# Patient Record
Sex: FEMALE | Race: ASIAN | NOT HISPANIC OR LATINO | URBAN - METROPOLITAN AREA
[De-identification: names, ages, dates, MRNs, and addresses within clinical notes are randomized per-mention and may not be internally consistent; named-entity substitution may affect disease eponyms.]

---

## 2019-12-12 PROBLEM — Z00.00 ENCOUNTER FOR PREVENTIVE HEALTH EXAMINATION: Status: ACTIVE | Noted: 2019-12-12

## 2019-12-21 ENCOUNTER — OUTPATIENT (OUTPATIENT)
Dept: INPATIENT UNIT | Facility: HOSPITAL | Age: 24
LOS: 1 days | Discharge: HOME | End: 2019-12-21
Payer: COMMERCIAL

## 2019-12-21 ENCOUNTER — EMERGENCY (EMERGENCY)
Facility: HOSPITAL | Age: 24
LOS: 1 days | Discharge: STILL ADMITTED OR ADMINISTRATIVE | End: 2019-12-21
Admitting: EMERGENCY MEDICINE

## 2019-12-21 VITALS
DIASTOLIC BLOOD PRESSURE: 63 MMHG | HEART RATE: 74 BPM | SYSTOLIC BLOOD PRESSURE: 109 MMHG | TEMPERATURE: 97 F | RESPIRATION RATE: 20 BRPM | OXYGEN SATURATION: 100 %

## 2019-12-21 VITALS — HEART RATE: 66 BPM | DIASTOLIC BLOOD PRESSURE: 55 MMHG | TEMPERATURE: 98 F | SYSTOLIC BLOOD PRESSURE: 91 MMHG

## 2019-12-21 VITALS — TEMPERATURE: 98 F

## 2019-12-21 DIAGNOSIS — O26.899 OTHER SPECIFIED PREGNANCY RELATED CONDITIONS, UNSPECIFIED TRIMESTER: ICD-10-CM

## 2019-12-21 DIAGNOSIS — Z3A.00 WEEKS OF GESTATION OF PREGNANCY NOT SPECIFIED: ICD-10-CM

## 2019-12-21 LAB
ALBUMIN SERPL ELPH-MCNC: 3.8 G/DL — SIGNIFICANT CHANGE UP (ref 3.3–5)
ALP SERPL-CCNC: 49 U/L — SIGNIFICANT CHANGE UP (ref 40–120)
ALT FLD-CCNC: 15 U/L — SIGNIFICANT CHANGE UP (ref 4–33)
ANION GAP SERPL CALC-SCNC: 12 MMO/L — SIGNIFICANT CHANGE UP (ref 7–14)
APPEARANCE UR: CLEAR — SIGNIFICANT CHANGE UP
AST SERPL-CCNC: 17 U/L — SIGNIFICANT CHANGE UP (ref 4–32)
BASOPHILS # BLD AUTO: 0.04 K/UL — SIGNIFICANT CHANGE UP (ref 0–0.2)
BASOPHILS NFR BLD AUTO: 0.4 % — SIGNIFICANT CHANGE UP (ref 0–2)
BILIRUB SERPL-MCNC: < 0.2 MG/DL — LOW (ref 0.2–1.2)
BILIRUB UR-MCNC: NEGATIVE — SIGNIFICANT CHANGE UP
BLD GP AB SCN SERPL QL: NEGATIVE — SIGNIFICANT CHANGE UP
BLOOD UR QL VISUAL: NEGATIVE — SIGNIFICANT CHANGE UP
BUN SERPL-MCNC: 7 MG/DL — SIGNIFICANT CHANGE UP (ref 7–23)
CALCIUM SERPL-MCNC: 9.2 MG/DL — SIGNIFICANT CHANGE UP (ref 8.4–10.5)
CHLORIDE SERPL-SCNC: 102 MMOL/L — SIGNIFICANT CHANGE UP (ref 98–107)
CO2 SERPL-SCNC: 21 MMOL/L — LOW (ref 22–31)
COLOR SPEC: SIGNIFICANT CHANGE UP
CREAT SERPL-MCNC: 0.49 MG/DL — LOW (ref 0.5–1.3)
EOSINOPHIL # BLD AUTO: 0.1 K/UL — SIGNIFICANT CHANGE UP (ref 0–0.5)
EOSINOPHIL NFR BLD AUTO: 1 % — SIGNIFICANT CHANGE UP (ref 0–6)
GLUCOSE SERPL-MCNC: 92 MG/DL — SIGNIFICANT CHANGE UP (ref 70–99)
GLUCOSE UR-MCNC: NEGATIVE — SIGNIFICANT CHANGE UP
HCT VFR BLD CALC: 33.1 % — LOW (ref 34.5–45)
HGB BLD-MCNC: 10.6 G/DL — LOW (ref 11.5–15.5)
IMM GRANULOCYTES NFR BLD AUTO: 0.7 % — SIGNIFICANT CHANGE UP (ref 0–1.5)
KETONES UR-MCNC: NEGATIVE — SIGNIFICANT CHANGE UP
LEUKOCYTE ESTERASE UR-ACNC: NEGATIVE — SIGNIFICANT CHANGE UP
LYMPHOCYTES # BLD AUTO: 2.64 K/UL — SIGNIFICANT CHANGE UP (ref 1–3.3)
LYMPHOCYTES # BLD AUTO: 25.3 % — SIGNIFICANT CHANGE UP (ref 13–44)
MCHC RBC-ENTMCNC: 25.7 PG — LOW (ref 27–34)
MCHC RBC-ENTMCNC: 32 % — SIGNIFICANT CHANGE UP (ref 32–36)
MCV RBC AUTO: 80.1 FL — SIGNIFICANT CHANGE UP (ref 80–100)
MONOCYTES # BLD AUTO: 0.73 K/UL — SIGNIFICANT CHANGE UP (ref 0–0.9)
MONOCYTES NFR BLD AUTO: 7 % — SIGNIFICANT CHANGE UP (ref 2–14)
NEUTROPHILS # BLD AUTO: 6.86 K/UL — SIGNIFICANT CHANGE UP (ref 1.8–7.4)
NEUTROPHILS NFR BLD AUTO: 65.6 % — SIGNIFICANT CHANGE UP (ref 43–77)
NITRITE UR-MCNC: NEGATIVE — SIGNIFICANT CHANGE UP
NRBC # FLD: 0 K/UL — SIGNIFICANT CHANGE UP (ref 0–0)
PH UR: 8 — SIGNIFICANT CHANGE UP (ref 5–8)
PLATELET # BLD AUTO: 246 K/UL — SIGNIFICANT CHANGE UP (ref 150–400)
PMV BLD: 9.5 FL — SIGNIFICANT CHANGE UP (ref 7–13)
POTASSIUM SERPL-MCNC: 3.8 MMOL/L — SIGNIFICANT CHANGE UP (ref 3.5–5.3)
POTASSIUM SERPL-SCNC: 3.8 MMOL/L — SIGNIFICANT CHANGE UP (ref 3.5–5.3)
PROT SERPL-MCNC: 6.7 G/DL — SIGNIFICANT CHANGE UP (ref 6–8.3)
PROT UR-MCNC: NEGATIVE — SIGNIFICANT CHANGE UP
RBC # BLD: 4.13 M/UL — SIGNIFICANT CHANGE UP (ref 3.8–5.2)
RBC # FLD: 14.3 % — SIGNIFICANT CHANGE UP (ref 10.3–14.5)
RH IG SCN BLD-IMP: POSITIVE — SIGNIFICANT CHANGE UP
RH IG SCN BLD-IMP: POSITIVE — SIGNIFICANT CHANGE UP
SODIUM SERPL-SCNC: 135 MMOL/L — SIGNIFICANT CHANGE UP (ref 135–145)
SP GR SPEC: 1.01 — SIGNIFICANT CHANGE UP (ref 1–1.04)
UROBILINOGEN FLD QL: NORMAL — SIGNIFICANT CHANGE UP
WBC # BLD: 10.44 K/UL — SIGNIFICANT CHANGE UP (ref 3.8–10.5)
WBC # FLD AUTO: 10.44 K/UL — SIGNIFICANT CHANGE UP (ref 3.8–10.5)

## 2019-12-21 PROCEDURE — 76818 FETAL BIOPHYS PROFILE W/NST: CPT | Mod: 26

## 2019-12-21 PROCEDURE — 99203 OFFICE O/P NEW LOW 30 MIN: CPT

## 2019-12-21 PROCEDURE — 74181 MRI ABDOMEN W/O CONTRAST: CPT | Mod: 26

## 2019-12-21 RX ORDER — SODIUM CHLORIDE 9 MG/ML
1000 INJECTION, SOLUTION INTRAVENOUS ONCE
Refills: 0 | Status: COMPLETED | OUTPATIENT
Start: 2019-12-21 | End: 2019-12-21

## 2019-12-21 RX ORDER — SODIUM CHLORIDE 9 MG/ML
1000 INJECTION, SOLUTION INTRAVENOUS
Refills: 0 | Status: DISCONTINUED | OUTPATIENT
Start: 2019-12-21 | End: 2020-01-11

## 2019-12-21 RX ORDER — ACETAMINOPHEN 500 MG
1000 TABLET ORAL ONCE
Refills: 0 | Status: COMPLETED | OUTPATIENT
Start: 2019-12-21 | End: 2019-12-21

## 2019-12-21 RX ADMIN — SODIUM CHLORIDE 2000 MILLILITER(S): 9 INJECTION, SOLUTION INTRAVENOUS at 02:57

## 2019-12-21 RX ADMIN — Medication 400 MILLIGRAM(S): at 02:33

## 2019-12-21 RX ADMIN — Medication 1000 MILLIGRAM(S): at 02:56

## 2019-12-21 NOTE — OB PROVIDER TRIAGE NOTE - NSHPPHYSICALEXAM_GEN_ALL_CORE
BP:91/55, HR:66, Temp:36.5  Positive Crain's sign   TAS: Breech presentation, anterior placenta, MVP:3, EFEW:701.21gm, BPP:8/8  Speculum: Cervix appears closed  TVS: CL: 3.3-3.6 no funneling or dynamic changes

## 2019-12-21 NOTE — OB PROVIDER TRIAGE NOTE - ADDITIONAL INSTRUCTIONS
No  evidence of acute abdominal concerns at this time.  - discussed with   - patient to follow up with OBGYN next week.

## 2019-12-21 NOTE — OB PROVIDER TRIAGE NOTE - NSHPLABSRESULTS_GEN_ALL_CORE
Final MRI reading:   -MRI of abdomen and pelvis, no bowel obstruction  -normal appendix  -no inflammatory changes in the right lower quadrant  -2nd trimester gestation in breech presentation with anterior placenta  -right ovary is unremarkable  -the left ovary was not clearly visualized  -mild hydronephrosis consistent with gravid state  -cause of abdominal pain not identified

## 2019-12-21 NOTE — ED ADULT TRIAGE NOTE - CHIEF COMPLAINT QUOTE
pt is 24 weeks pregnant and is having right sided lower abdominal pain since an hour ago. . No vaginal bleeding or leaking. pt is sent to L&D. pt is 24 weeks pregnant and is having right sided lower abdominal pain since an hour ago. . No vaginal bleeding or leaking. pt is sent to L&D. LMP .

## 2019-12-21 NOTE — OB PROVIDER TRIAGE NOTE - NSOBPROVIDERNOTE_OBGYN_ALL_OB_FT
Dr. Monreal's pt. is a 25y/o EGA 24.5wks  pt. reports of severe lower right abdominal/right groin pain that started around 2200. Pt. states the pain comes and goes and is sharp. Pt. becomes more intense with fetal movement. Pt. denies N/V and lost of appetite. Pt. reports of normal bowel movement this morning. Pt. denies abdominal contractions, LOF, and VB. Pt. reports FM.    AP: Denies  Medical/surgical Hx: Denies  OBGYN Hx: Denies  Meds:PNV  NKDA      Assessment/Plan  BP:91/55, HR:66, Temp:36.5  NPO since 2100  Positive Crain's sign   TAS: Breech presentation, anterior placenta, MVP:3, EFEW:701.21gm, BPP:8/8  Speculum: Cervix appears closed  TVS: CL: 3.3-3.6 no funneling or dynamic changes Dr. Monreal's pt. is a 25y/o EGA 24.5wks  pt. reports of severe lower right abdominal/right groin pain that started around 2200. Pt. states the pain comes and goes and is sharp. Pt. becomes more intense with fetal movement. Pt. denies N/V and lost of appetite. Pt. reports of normal bowel movement this morning. Pt. denies abdominal contractions, LOF, and VB. Pt. reports FM.    AP: Denies  Medical/surgical Hx: Denies  OBGYN Hx: Denies  Meds:PNV  NKDA      Assessment/Plan  BP:91/55, HR:66, Temp:36.5  NPO since 2100  Positive Crain's sign   TAS: Breech presentation, anterior placenta, MVP:3, EFEW:701.21gm, BPP:8/8  Speculum: Cervix appears closed  TVS: CL: 3.3-3.6 no funneling or dynamic changes    2:10am Dr. Diaz at bedside  Plan for  -CBC, CMP, and T&S ordered  -MRI of abdomen ordered   -IV Tylenol ordered Pt. is a 23y/o EGA 24.5wks  pt. received prenatal care from [facility] in [place]. Pt. states the clinic dropped her care d/t insurance issues and now has an appointment with Dr. Monreal on 19. Pt. reports of severe lower right abdominal/right groin pain that started around 2200. Pt. states the pain comes and goes and is sharp. Pt. becomes more intense with fetal movement. Pt. denies N/V and lost of appetite. Pt. reports of normal bowel movement this morning. Pt. denies abdominal contractions, LOF, and VB. Pt. reports FM.    AP: Denies  Medical/surgical Hx: Denies  OBGYN Hx: Denies  Meds: PNV  NKDA      Assessment/Plan  BP:91/55, HR:66, Temp:36.5  NPO since 2100  Positive Crain's sign   TAS: Breech presentation, anterior placenta, MVP:3, EFEW:701.21gm, BPP:8/8  Speculum: Cervix appears closed  TVS: CL: 3.3-3.6 no funneling or dynamic changes    2:10am Dr. Diaz at bedside  Plan for  -CBC, CMP, and T&S ordered  -MRI of abdomen ordered   -IV Tylenol ordered Pt. is a 25y/o EGA 24.5wks  pt. received prenatal care from Dr. Mccoy with [facility] in [place]. Pt. states the clinic dropped her care d/t insurance issues and now has an appointment with Dr. Monreal on 19. Pt. reports of severe lower right abdominal/right groin pain that started around 2200. Pt. states the pain comes and goes and is sharp. Pt. becomes more intense with fetal movement. Pt. denies N/V and lost of appetite. Pt. reports of normal bowel movement this morning. Pt. denies abdominal contractions, LOF, and VB. Pt. reports FM.    AP: Denies  Medical Hx: Alpha thalassemia   Surgical Hx: Denies  OBGYN Hx: Denies  Meds: PNV  NKDA      Assessment/Plan  BP:91/55, HR:66, Temp:36.5  NPO since 2100  Positive Crain's sign   TAS: Breech presentation, anterior placenta, MVP:3, EFEW:701.21gm, BPP:8/8  Speculum: Cervix appears closed  TVS: CL: 3.3-3.6 no funneling or dynamic changes    2:10am Dr. Diaz at bedside  Plan for  -CBC, CMP, and T&S ordered  -MRI of abdomen ordered   -IV Tylenol ordered Pt. is a 25y/o EGA 24.5wks  pt. received prenatal care from Dr. Mccoy with [facility] in [place]. Pt. states the clinic dropped her care d/t insurance issues and now has an appointment with Dr. Monreal on 19. Pt. reports of severe lower right abdominal/right groin pain that started around 2200. Pt. states the pain comes and goes and is sharp. Pt. becomes more intense with fetal movement. Pt. denies N/V and lost of appetite. Pt. reports of normal bowel movement this morning. Pt. denies abdominal contractions, LOF, and VB. Pt. reports FM.    AP: Denies  Medical Hx: Alpha thalassemia   Surgical Hx: Denies  OBGYN Hx: Denies  Meds: PNV  NKDA      Assessment/Plan  BP:91/55, HR:66, Temp:36.5  NPO since 2100  Positive Crain's sign   TAS: Breech presentation, anterior placenta, MVP:3, EFEW:701.21gm, BPP:8/8  Speculum: Cervix appears closed  TVS: CL: 3.3-3.6 no funneling or dynamic changes    2:10am Dr. Diaz at bedside  Plan for  -CBC, CMP, and T&S ordered  -MRI of abdomen ordered   -IV Tylenol ordered    @4am pt. states relief from IV Tylenol. Pain 0/10  CBC Full  -  ( 21 Dec 2019 02:40 )  WBC Count : 10.44 K/uL  RBC Count : 4.13 M/uL  Hemoglobin : 10.6 g/dL  Hematocrit : 33.1 %  Platelet Count - Automated : 246 K/uL  Mean Cell Volume : 80.1 fL  Mean Cell Hemoglobin : 25.7 pg  Mean Cell Hemoglobin Concentration : 32.0 %  Auto Neutrophil # : 6.86 K/uL  Auto Lymphocyte # : 2.64 K/uL  Auto Monocyte # : 0.73 K/uL  Auto Eosinophil # : 0.10 K/uL  Auto Basophil # : 0.04 K/uL  Auto Neutrophil % : 65.6 %  Auto Lymphocyte % : 25.3 %  Auto Monocyte % : 7.0 %  Auto Eosinophil % : 1.0 %  Auto Basophil % : 0.4 %        135  |  102  |  7   ----------------------------<  92  3.8   |  21<L>  |  0.49<L>    Ca    9.2      21 Dec 2019 02:40    TPro  6.7  /  Alb  3.8  /  TBili  < 0.2<L>  /  DBili  x   /  AST  17  /  ALT  15  /  AlkPhos  49  12-21    Urinalysis Basic - ( 21 Dec 2019 03:55 )    Color: LIGHT YELLOW / Appearance: CLEAR / S.014 / pH: 8.0  Gluc: NEGATIVE / Ketone: NEGATIVE  / Bili: NEGATIVE / Urobili: NORMAL   Blood: NEGATIVE / Protein: NEGATIVE / Nitrite: NEGATIVE   Leuk Esterase: NEGATIVE / RBC: x / WBC x   Sq Epi: x / Non Sq Epi: x / Bacteria: x    @530am Reading room called for MRI result. Preliminary will be put in EMR shortly Pt. is a 25y/o EGA 24.5wks  pt. received prenatal care from Dr. Mccoy with [facility] in [place]. Pt. states the clinic dropped her care d/t insurance issues and now has an appointment with Dr. Monreal on 19. Pt. reports of severe lower right abdominal/right groin pain that started around 2200. Pt. states the pain comes and goes and is sharp. Pt. becomes more intense with fetal movement. Pt. denies N/V and loss of appetite. Pt. reports of normal bowel movement this morning. Pt. denies abdominal contractions, LOF, and VB. Pt. reports FM.    AP: Denies  Medical Hx: Alpha thalassemia   Surgical Hx: Denies  OBGYN Hx: Denies  Meds: PNV  NKDA      Assessment/Plan  BP:91/55, HR:66, Temp:36.5  NPO since 2100  Positive rebound tenderness  TAS: Breech presentation, anterior placenta, MVP:3, EFEW:701.21gm, BPP:8/8  Speculum: Cervix appears closed  TVS: CL: 3.3-3.6 no funneling or dynamic changes    2:10am Dr. Diaz at bedside  Plan for  -CBC, CMP, and T&S ordered  -MRI of abdomen ordered   -IV Tylenol ordered    @4am pt. states relief from IV Tylenol. Pain 0/10  CBC Full  -  ( 21 Dec 2019 02:40 )  WBC Count : 10.44 K/uL  RBC Count : 4.13 M/uL  Hemoglobin : 10.6 g/dL  Hematocrit : 33.1 %  Platelet Count - Automated : 246 K/uL  Mean Cell Volume : 80.1 fL  Mean Cell Hemoglobin : 25.7 pg  Mean Cell Hemoglobin Concentration : 32.0 %  Auto Neutrophil # : 6.86 K/uL  Auto Lymphocyte # : 2.64 K/uL  Auto Monocyte # : 0.73 K/uL  Auto Eosinophil # : 0.10 K/uL  Auto Basophil # : 0.04 K/uL  Auto Neutrophil % : 65.6 %  Auto Lymphocyte % : 25.3 %  Auto Monocyte % : 7.0 %  Auto Eosinophil % : 1.0 %  Auto Basophil % : 0.4 %        135  |  102  |  7   ----------------------------<  92  3.8   |  21<L>  |  0.49<L>    Ca    9.2      21 Dec 2019 02:40    TPro  6.7  /  Alb  3.8  /  TBili  < 0.2<L>  /  DBili  x   /  AST  17  /  ALT  15  /  AlkPhos  49  12-21    Urinalysis Basic - ( 21 Dec 2019 03:55 )    Color: LIGHT YELLOW / Appearance: CLEAR / S.014 / pH: 8.0  Gluc: NEGATIVE / Ketone: NEGATIVE  / Bili: NEGATIVE / Urobili: NORMAL   Blood: NEGATIVE / Protein: NEGATIVE / Nitrite: NEGATIVE   Leuk Esterase: NEGATIVE / RBC: x / WBC x   Sq Epi: x / Non Sq Epi: x / Bacteria: x    @530am Reading room called for MRI result. Preliminary will be put in EMR shortly Pt. is a 23y/o EGA 24.5wks  pt. received prenatal care from Dr. Mccoy with [facility] in [place]. Pt. states the clinic dropped her care d/t insurance issues and now has an appointment with Dr. Monreal on 19. Pt. reports of severe lower right abdominal/right groin pain that started around 2200. Pt. states the pain comes and goes and is sharp. Pt. becomes more intense with fetal movement. Pt. denies N/V and loss of appetite. Pt. reports of normal bowel movement this morning. Pt. denies abdominal contractions, LOF, and VB. Pt. reports FM.    AP: Denies  Medical Hx: Alpha thalassemia   Surgical Hx: Denies  OBGYN Hx: Denies  Meds: PNV  NKDA      Assessment/Plan  BP:91/55, HR:66, Temp:36.5  NPO since 2100  Positive rebound tenderness  TAS: Breech presentation, anterior placenta, MVP:3, EFEW:701.21gm, BPP:8/8  Speculum: Cervix appears closed  TVS: CL: 3.3-3.6 no funneling or dynamic changes    2:10am Dr. Diaz at bedside  Plan for  -CBC, CMP, and T&S ordered  -MRI of abdomen ordered   -IV Tylenol ordered    @4am pt. states relief from IV Tylenol. Pain 0/10  CBC Full  -  ( 21 Dec 2019 02:40 )  WBC Count : 10.44 K/uL  RBC Count : 4.13 M/uL  Hemoglobin : 10.6 g/dL  Hematocrit : 33.1 %  Platelet Count - Automated : 246 K/uL  Mean Cell Volume : 80.1 fL  Mean Cell Hemoglobin : 25.7 pg  Mean Cell Hemoglobin Concentration : 32.0 %  Auto Neutrophil # : 6.86 K/uL  Auto Lymphocyte # : 2.64 K/uL  Auto Monocyte # : 0.73 K/uL  Auto Eosinophil # : 0.10 K/uL  Auto Basophil # : 0.04 K/uL  Auto Neutrophil % : 65.6 %  Auto Lymphocyte % : 25.3 %  Auto Monocyte % : 7.0 %  Auto Eosinophil % : 1.0 %  Auto Basophil % : 0.4 %        135  |  102  |  7   ----------------------------<  92  3.8   |  21<L>  |  0.49<L>    Ca    9.2      21 Dec 2019 02:40    TPro  6.7  /  Alb  3.8  /  TBili  < 0.2<L>  /  DBili  x   /  AST  17  /  ALT  15  /  AlkPhos  49  12-21    Urinalysis Basic - ( 21 Dec 2019 03:55 )    Color: LIGHT YELLOW / Appearance: CLEAR / S.014 / pH: 8.0  Gluc: NEGATIVE / Ketone: NEGATIVE  / Bili: NEGATIVE / Urobili: NORMAL   Blood: NEGATIVE / Protein: NEGATIVE / Nitrite: NEGATIVE   Leuk Esterase: NEGATIVE / RBC: x / WBC x   Sq Epi: x / Non Sq Epi: x / Bacteria: x    @530am Reading room called for MRI result. Preliminary will be put in EMR shortly    Final MRI reading:   -MRI of abdomen and pelvis, no bowel obstruction  -normal appendix  -no inflammatory changes in the right lower quadrant  -2nd trimester gestation in breech presentation with anterior placenta  -right ovary is unremarkable  -the left ovary was not clearly visualized  -mild hydronephrosis consistent with gravid state  -cause of abdominal pain not identified    No  evidence of acute abdominal concerns at this time.  - discussed with   - patient to follow up with OBGYN next week.

## 2019-12-21 NOTE — ED ADULT NURSE NOTE - CHIEF COMPLAINT QUOTE
pt is 24 weeks pregnant and is having right sided lower abdominal pain since an hour ago. . No vaginal bleeding or leaking. pt is sent to L&D.

## 2019-12-21 NOTE — OB PROVIDER TRIAGE NOTE - HISTORY OF PRESENT ILLNESS
Dr. Monreal's pt. is a 25y/o EGA 24.5wks  pt. reports of severe lower right abdominal/right groin pain that started around 2200. Pt. states the pain comes and goes and is sharp. Pt. becomes more intense with fetal movement. Pt. denies N/V and lost of appetite. Pt. reports of normal bowel movement this morning. Pt. denies abdominal contractions, LOF, and VB. Pt. reports FM. Pt. is a 25y/o EGA 24.5wks  pt. received prenatal care from [facility] in [place]. Pt. states the clinic dropped her care d/t insurance issues and now has an appointment with Dr. Monreal on 19. Pt. reports of severe lower right abdominal/right groin pain that started around 2200. Pt. states the pain comes and goes and is sharp. Pt. becomes more intense with fetal movement. Pt. denies N/V and lost of appetite. Pt. reports of normal bowel movement this morning. Pt. denies abdominal contractions, LOF, and VB. Pt. reports FM.

## 2019-12-30 ENCOUNTER — NON-APPOINTMENT (OUTPATIENT)
Age: 24
End: 2019-12-30

## 2019-12-30 ENCOUNTER — APPOINTMENT (OUTPATIENT)
Dept: OBGYN | Facility: CLINIC | Age: 24
End: 2019-12-30
Payer: COMMERCIAL

## 2019-12-30 ENCOUNTER — TRANSCRIPTION ENCOUNTER (OUTPATIENT)
Age: 24
End: 2019-12-30

## 2019-12-30 VITALS
SYSTOLIC BLOOD PRESSURE: 100 MMHG | HEIGHT: 62 IN | BODY MASS INDEX: 20.61 KG/M2 | DIASTOLIC BLOOD PRESSURE: 59 MMHG | WEIGHT: 112 LBS

## 2019-12-30 DIAGNOSIS — Z83.3 FAMILY HISTORY OF DIABETES MELLITUS: ICD-10-CM

## 2019-12-30 DIAGNOSIS — Z82.49 FAMILY HISTORY OF ISCHEMIC HEART DISEASE AND OTHER DISEASES OF THE CIRCULATORY SYSTEM: ICD-10-CM

## 2019-12-30 PROBLEM — Z78.9 OTHER SPECIFIED HEALTH STATUS: Chronic | Status: ACTIVE | Noted: 2019-12-21

## 2019-12-30 PROCEDURE — 99203 OFFICE O/P NEW LOW 30 MIN: CPT

## 2019-12-31 LAB
APPEARANCE: ABNORMAL
BACTERIA: ABNORMAL
BASOPHILS # BLD AUTO: 0.02 K/UL
BASOPHILS NFR BLD AUTO: 0.2 %
BILIRUBIN URINE: NEGATIVE
BLOOD URINE: NEGATIVE
COLOR: YELLOW
EOSINOPHIL # BLD AUTO: 0.1 K/UL
EOSINOPHIL NFR BLD AUTO: 1.1 %
GLUCOSE 1H P 50 G GLC PO SERPL-MCNC: 111 MG/DL
GLUCOSE QUALITATIVE U: ABNORMAL
HCT VFR BLD CALC: 32.2 %
HGB BLD-MCNC: 10.1 G/DL
HYALINE CASTS: 0 /LPF
IMM GRANULOCYTES NFR BLD AUTO: 0.8 %
KETONES URINE: NEGATIVE
LEUKOCYTE ESTERASE URINE: NEGATIVE
LYMPHOCYTES # BLD AUTO: 1.63 K/UL
LYMPHOCYTES NFR BLD AUTO: 17.5 %
MAN DIFF?: NORMAL
MCHC RBC-ENTMCNC: 25.6 PG
MCHC RBC-ENTMCNC: 31.4 GM/DL
MCV RBC AUTO: 81.5 FL
MICROSCOPIC-UA: NORMAL
MONOCYTES # BLD AUTO: 0.69 K/UL
MONOCYTES NFR BLD AUTO: 7.4 %
NEUTROPHILS # BLD AUTO: 6.8 K/UL
NEUTROPHILS NFR BLD AUTO: 73 %
NITRITE URINE: NEGATIVE
PH URINE: 6.5
PLATELET # BLD AUTO: 251 K/UL
PROTEIN URINE: NORMAL
RBC # BLD: 3.95 M/UL
RBC # FLD: 14.4 %
RED BLOOD CELLS URINE: 0 /HPF
SPECIFIC GRAVITY URINE: 1.02
SQUAMOUS EPITHELIAL CELLS: 23 /HPF
UROBILINOGEN URINE: NORMAL
WBC # FLD AUTO: 9.31 K/UL
WHITE BLOOD CELLS URINE: 5 /HPF

## 2020-01-01 LAB — BACTERIA UR CULT: NORMAL

## 2020-01-22 ENCOUNTER — NON-APPOINTMENT (OUTPATIENT)
Age: 25
End: 2020-01-22

## 2020-01-22 ENCOUNTER — APPOINTMENT (OUTPATIENT)
Dept: OBGYN | Facility: CLINIC | Age: 25
End: 2020-01-22
Payer: COMMERCIAL

## 2020-01-22 VITALS
DIASTOLIC BLOOD PRESSURE: 73 MMHG | WEIGHT: 119 LBS | BODY MASS INDEX: 21.9 KG/M2 | SYSTOLIC BLOOD PRESSURE: 103 MMHG | HEIGHT: 62 IN

## 2020-01-22 PROCEDURE — 0502F SUBSEQUENT PRENATAL CARE: CPT

## 2020-02-10 ENCOUNTER — ASOB RESULT (OUTPATIENT)
Age: 25
End: 2020-02-10

## 2020-02-10 ENCOUNTER — NON-APPOINTMENT (OUTPATIENT)
Age: 25
End: 2020-02-10

## 2020-02-10 ENCOUNTER — APPOINTMENT (OUTPATIENT)
Dept: OBGYN | Facility: CLINIC | Age: 25
End: 2020-02-10
Payer: COMMERCIAL

## 2020-02-10 ENCOUNTER — APPOINTMENT (OUTPATIENT)
Dept: ANTEPARTUM | Facility: CLINIC | Age: 25
End: 2020-02-10
Payer: COMMERCIAL

## 2020-02-10 VITALS
WEIGHT: 122 LBS | BODY MASS INDEX: 22.45 KG/M2 | DIASTOLIC BLOOD PRESSURE: 59 MMHG | HEIGHT: 62 IN | SYSTOLIC BLOOD PRESSURE: 94 MMHG

## 2020-02-10 PROCEDURE — 0502F SUBSEQUENT PRENATAL CARE: CPT

## 2020-02-10 PROCEDURE — 76816 OB US FOLLOW-UP PER FETUS: CPT

## 2020-02-23 ENCOUNTER — NON-APPOINTMENT (OUTPATIENT)
Age: 25
End: 2020-02-23

## 2020-02-24 ENCOUNTER — APPOINTMENT (OUTPATIENT)
Dept: OBGYN | Facility: CLINIC | Age: 25
End: 2020-02-24
Payer: COMMERCIAL

## 2020-02-24 ENCOUNTER — NON-APPOINTMENT (OUTPATIENT)
Age: 25
End: 2020-02-24

## 2020-02-24 VITALS
SYSTOLIC BLOOD PRESSURE: 94 MMHG | HEIGHT: 62 IN | DIASTOLIC BLOOD PRESSURE: 63 MMHG | BODY MASS INDEX: 23.19 KG/M2 | WEIGHT: 126 LBS

## 2020-02-24 PROCEDURE — 0502F SUBSEQUENT PRENATAL CARE: CPT

## 2020-03-12 ENCOUNTER — NON-APPOINTMENT (OUTPATIENT)
Age: 25
End: 2020-03-12

## 2020-03-12 ENCOUNTER — APPOINTMENT (OUTPATIENT)
Dept: OBGYN | Facility: CLINIC | Age: 25
End: 2020-03-12
Payer: COMMERCIAL

## 2020-03-12 VITALS
SYSTOLIC BLOOD PRESSURE: 103 MMHG | BODY MASS INDEX: 23.55 KG/M2 | HEIGHT: 62 IN | WEIGHT: 128 LBS | DIASTOLIC BLOOD PRESSURE: 66 MMHG

## 2020-03-12 DIAGNOSIS — Z34.02 ENCOUNTER FOR SUPERVISION OF NORMAL FIRST PREGNANCY, SECOND TRIMESTER: ICD-10-CM

## 2020-03-12 PROCEDURE — 0502F SUBSEQUENT PRENATAL CARE: CPT

## 2020-03-13 LAB
C TRACH RRNA SPEC QL NAA+PROBE: NOT DETECTED
N GONORRHOEA RRNA SPEC QL NAA+PROBE: NOT DETECTED
SOURCE AMPLIFICATION: NORMAL

## 2020-03-14 LAB
GP B STREP DNA SPEC QL NAA+PROBE: NORMAL
GP B STREP DNA SPEC QL NAA+PROBE: NOT DETECTED
SOURCE GBS: NORMAL

## 2020-03-19 ENCOUNTER — NON-APPOINTMENT (OUTPATIENT)
Age: 25
End: 2020-03-19

## 2020-03-19 ENCOUNTER — APPOINTMENT (OUTPATIENT)
Dept: OBGYN | Facility: CLINIC | Age: 25
End: 2020-03-19
Payer: COMMERCIAL

## 2020-03-19 VITALS
HEIGHT: 62 IN | BODY MASS INDEX: 23.92 KG/M2 | DIASTOLIC BLOOD PRESSURE: 76 MMHG | SYSTOLIC BLOOD PRESSURE: 108 MMHG | WEIGHT: 130 LBS

## 2020-03-19 PROCEDURE — 0502F SUBSEQUENT PRENATAL CARE: CPT

## 2020-03-20 LAB
HIV1+2 AB SPEC QL IA.RAPID: NONREACTIVE
T PALLIDUM AB SER DONR QL IF: NONREACTIVE
T PALLIDUM AB SER QL IA: NEGATIVE

## 2020-03-26 ENCOUNTER — NON-APPOINTMENT (OUTPATIENT)
Age: 25
End: 2020-03-26

## 2020-03-26 ENCOUNTER — APPOINTMENT (OUTPATIENT)
Dept: OBGYN | Facility: CLINIC | Age: 25
End: 2020-03-26
Payer: COMMERCIAL

## 2020-03-26 VITALS
HEIGHT: 62 IN | DIASTOLIC BLOOD PRESSURE: 74 MMHG | BODY MASS INDEX: 24.11 KG/M2 | WEIGHT: 131 LBS | SYSTOLIC BLOOD PRESSURE: 119 MMHG

## 2020-03-26 PROCEDURE — 0502F SUBSEQUENT PRENATAL CARE: CPT

## 2020-03-30 ENCOUNTER — INPATIENT (INPATIENT)
Facility: HOSPITAL | Age: 25
LOS: 1 days | Discharge: HOME | End: 2020-04-01
Attending: OBSTETRICS & GYNECOLOGY | Admitting: OBSTETRICS & GYNECOLOGY
Payer: COMMERCIAL

## 2020-03-30 VITALS — HEIGHT: 61 IN | WEIGHT: 130.07 LBS

## 2020-03-30 DIAGNOSIS — Z3A.00 WEEKS OF GESTATION OF PREGNANCY NOT SPECIFIED: ICD-10-CM

## 2020-03-30 DIAGNOSIS — Z34.80 ENCOUNTER FOR SUPERVISION OF OTHER NORMAL PREGNANCY, UNSPECIFIED TRIMESTER: ICD-10-CM

## 2020-03-30 DIAGNOSIS — O26.899 OTHER SPECIFIED PREGNANCY RELATED CONDITIONS, UNSPECIFIED TRIMESTER: ICD-10-CM

## 2020-03-30 LAB
AMNISURE ROM (RUPTURE OF MEMBRANES): POSITIVE
APTT BLD: 28.9 SEC — SIGNIFICANT CHANGE UP (ref 27.5–36.3)
BASOPHILS # BLD AUTO: 0.04 K/UL — SIGNIFICANT CHANGE UP (ref 0–0.2)
BASOPHILS NFR BLD AUTO: 0.3 % — SIGNIFICANT CHANGE UP (ref 0–2)
EOSINOPHIL # BLD AUTO: 0.06 K/UL — SIGNIFICANT CHANGE UP (ref 0–0.5)
EOSINOPHIL NFR BLD AUTO: 0.5 % — SIGNIFICANT CHANGE UP (ref 0–6)
FIBRINOGEN PPP-MCNC: 633 MG/DL — HIGH (ref 350–510)
HCT VFR BLD CALC: 38.3 % — SIGNIFICANT CHANGE UP (ref 34.5–45)
HGB BLD-MCNC: 12.2 G/DL — SIGNIFICANT CHANGE UP (ref 11.5–15.5)
IMM GRANULOCYTES NFR BLD AUTO: 0.7 % — SIGNIFICANT CHANGE UP (ref 0–1.5)
INR BLD: 0.9 RATIO — SIGNIFICANT CHANGE UP (ref 0.88–1.16)
LYMPHOCYTES # BLD AUTO: 2.33 K/UL — SIGNIFICANT CHANGE UP (ref 1–3.3)
LYMPHOCYTES # BLD AUTO: 20.1 % — SIGNIFICANT CHANGE UP (ref 13–44)
MCHC RBC-ENTMCNC: 25.4 PG — LOW (ref 27–34)
MCHC RBC-ENTMCNC: 31.9 GM/DL — LOW (ref 32–36)
MCV RBC AUTO: 79.8 FL — LOW (ref 80–100)
MONOCYTES # BLD AUTO: 0.71 K/UL — SIGNIFICANT CHANGE UP (ref 0–0.9)
MONOCYTES NFR BLD AUTO: 6.1 % — SIGNIFICANT CHANGE UP (ref 2–14)
NEUTROPHILS # BLD AUTO: 8.35 K/UL — HIGH (ref 1.8–7.4)
NEUTROPHILS NFR BLD AUTO: 72.3 % — SIGNIFICANT CHANGE UP (ref 43–77)
NRBC # BLD: 0 /100 WBCS — SIGNIFICANT CHANGE UP (ref 0–0)
PLATELET # BLD AUTO: 207 K/UL — SIGNIFICANT CHANGE UP (ref 150–400)
PROTHROM AB SERPL-ACNC: 10.1 SEC — SIGNIFICANT CHANGE UP (ref 10–12.9)
RBC # BLD: 4.8 M/UL — SIGNIFICANT CHANGE UP (ref 3.8–5.2)
RBC # FLD: 14.6 % — HIGH (ref 10.3–14.5)
WBC # BLD: 11.57 K/UL — HIGH (ref 3.8–10.5)
WBC # FLD AUTO: 11.57 K/UL — HIGH (ref 3.8–10.5)

## 2020-03-30 PROCEDURE — 99223 1ST HOSP IP/OBS HIGH 75: CPT

## 2020-03-30 RX ORDER — SODIUM CHLORIDE 9 MG/ML
1000 INJECTION, SOLUTION INTRAVENOUS
Refills: 0 | Status: DISCONTINUED | OUTPATIENT
Start: 2020-03-30 | End: 2020-03-31

## 2020-03-30 RX ORDER — CITRIC ACID/SODIUM CITRATE 300-500 MG
15 SOLUTION, ORAL ORAL EVERY 6 HOURS
Refills: 0 | Status: DISCONTINUED | OUTPATIENT
Start: 2020-03-30 | End: 2020-03-30

## 2020-03-30 RX ORDER — OXYTOCIN 10 UNIT/ML
333.33 VIAL (ML) INJECTION
Qty: 20 | Refills: 0 | Status: DISCONTINUED | OUTPATIENT
Start: 2020-03-30 | End: 2020-04-01

## 2020-03-30 RX ORDER — CITRIC ACID/SODIUM CITRATE 300-500 MG
15 SOLUTION, ORAL ORAL EVERY 6 HOURS
Refills: 0 | Status: DISCONTINUED | OUTPATIENT
Start: 2020-03-30 | End: 2020-03-31

## 2020-03-30 RX ORDER — SODIUM CHLORIDE 9 MG/ML
1000 INJECTION, SOLUTION INTRAVENOUS
Refills: 0 | Status: DISCONTINUED | OUTPATIENT
Start: 2020-03-30 | End: 2020-03-30

## 2020-03-30 RX ORDER — ONDANSETRON 8 MG/1
4 TABLET, FILM COATED ORAL EVERY 6 HOURS
Refills: 0 | Status: DISCONTINUED | OUTPATIENT
Start: 2020-03-30 | End: 2020-03-31

## 2020-03-30 RX ADMIN — SODIUM CHLORIDE 125 MILLILITER(S): 9 INJECTION, SOLUTION INTRAVENOUS at 18:53

## 2020-03-31 LAB — T PALLIDUM AB TITR SER: NEGATIVE — SIGNIFICANT CHANGE UP

## 2020-03-31 PROCEDURE — ZZZZZ: CPT

## 2020-03-31 PROCEDURE — 59409 OBSTETRICAL CARE: CPT | Mod: U9

## 2020-03-31 RX ORDER — TETANUS TOXOID, REDUCED DIPHTHERIA TOXOID AND ACELLULAR PERTUSSIS VACCINE, ADSORBED 5; 2.5; 8; 8; 2.5 [IU]/.5ML; [IU]/.5ML; UG/.5ML; UG/.5ML; UG/.5ML
0.5 SUSPENSION INTRAMUSCULAR ONCE
Refills: 0 | Status: DISCONTINUED | OUTPATIENT
Start: 2020-03-31 | End: 2020-04-01

## 2020-03-31 RX ORDER — OXYTOCIN 10 UNIT/ML
333.33 VIAL (ML) INJECTION
Qty: 20 | Refills: 0 | Status: COMPLETED | OUTPATIENT
Start: 2020-03-31 | End: 2020-03-31

## 2020-03-31 RX ORDER — HYDROCORTISONE 1 %
1 OINTMENT (GRAM) TOPICAL EVERY 6 HOURS
Refills: 0 | Status: DISCONTINUED | OUTPATIENT
Start: 2020-03-31 | End: 2020-04-01

## 2020-03-31 RX ORDER — IBUPROFEN 200 MG
600 TABLET ORAL EVERY 6 HOURS
Refills: 0 | Status: DISCONTINUED | OUTPATIENT
Start: 2020-03-31 | End: 2020-04-01

## 2020-03-31 RX ORDER — BENZOCAINE 10 %
1 GEL (GRAM) MUCOUS MEMBRANE EVERY 6 HOURS
Refills: 0 | Status: DISCONTINUED | OUTPATIENT
Start: 2020-03-31 | End: 2020-04-01

## 2020-03-31 RX ORDER — PRAMOXINE HYDROCHLORIDE 150 MG/15G
1 AEROSOL, FOAM RECTAL EVERY 4 HOURS
Refills: 0 | Status: DISCONTINUED | OUTPATIENT
Start: 2020-03-31 | End: 2020-04-01

## 2020-03-31 RX ORDER — DIPHENHYDRAMINE HCL 50 MG
25 CAPSULE ORAL EVERY 6 HOURS
Refills: 0 | Status: DISCONTINUED | OUTPATIENT
Start: 2020-03-31 | End: 2020-04-01

## 2020-03-31 RX ORDER — SODIUM CHLORIDE 9 MG/ML
3 INJECTION INTRAMUSCULAR; INTRAVENOUS; SUBCUTANEOUS EVERY 8 HOURS
Refills: 0 | Status: DISCONTINUED | OUTPATIENT
Start: 2020-03-31 | End: 2020-04-01

## 2020-03-31 RX ORDER — SIMETHICONE 80 MG/1
80 TABLET, CHEWABLE ORAL EVERY 4 HOURS
Refills: 0 | Status: DISCONTINUED | OUTPATIENT
Start: 2020-03-31 | End: 2020-04-01

## 2020-03-31 RX ORDER — DIBUCAINE 1 %
1 OINTMENT (GRAM) RECTAL EVERY 6 HOURS
Refills: 0 | Status: DISCONTINUED | OUTPATIENT
Start: 2020-03-31 | End: 2020-04-01

## 2020-03-31 RX ORDER — ACETAMINOPHEN 500 MG
975 TABLET ORAL EVERY 6 HOURS
Refills: 0 | Status: DISCONTINUED | OUTPATIENT
Start: 2020-03-31 | End: 2020-04-01

## 2020-03-31 RX ORDER — OXYCODONE HYDROCHLORIDE 5 MG/1
5 TABLET ORAL
Refills: 0 | Status: DISCONTINUED | OUTPATIENT
Start: 2020-03-31 | End: 2020-04-01

## 2020-03-31 RX ORDER — LANOLIN
1 OINTMENT (GRAM) TOPICAL EVERY 6 HOURS
Refills: 0 | Status: DISCONTINUED | OUTPATIENT
Start: 2020-03-31 | End: 2020-04-01

## 2020-03-31 RX ORDER — AER TRAVELER 0.5 G/1
1 SOLUTION RECTAL; TOPICAL EVERY 4 HOURS
Refills: 0 | Status: DISCONTINUED | OUTPATIENT
Start: 2020-03-31 | End: 2020-04-01

## 2020-03-31 RX ADMIN — SODIUM CHLORIDE 125 MILLILITER(S): 9 INJECTION, SOLUTION INTRAVENOUS at 10:05

## 2020-03-31 RX ADMIN — Medication 1 SPRAY(S): at 17:33

## 2020-03-31 RX ADMIN — SODIUM CHLORIDE 3 MILLILITER(S): 9 INJECTION INTRAMUSCULAR; INTRAVENOUS; SUBCUTANEOUS at 22:18

## 2020-03-31 RX ADMIN — SODIUM CHLORIDE 3 MILLILITER(S): 9 INJECTION INTRAMUSCULAR; INTRAVENOUS; SUBCUTANEOUS at 15:35

## 2020-03-31 RX ADMIN — Medication 1000 MILLIUNIT(S)/MIN: at 16:28

## 2020-03-31 RX ADMIN — Medication 975 MILLIGRAM(S): at 22:20

## 2020-04-01 ENCOUNTER — TRANSCRIPTION ENCOUNTER (OUTPATIENT)
Age: 25
End: 2020-04-01

## 2020-04-01 VITALS
RESPIRATION RATE: 16 BRPM | HEART RATE: 74 BPM | SYSTOLIC BLOOD PRESSURE: 111 MMHG | TEMPERATURE: 98 F | DIASTOLIC BLOOD PRESSURE: 66 MMHG | OXYGEN SATURATION: 98 %

## 2020-04-01 LAB
BASOPHILS # BLD AUTO: 0.03 K/UL — SIGNIFICANT CHANGE UP (ref 0–0.2)
BASOPHILS NFR BLD AUTO: 0.2 % — SIGNIFICANT CHANGE UP (ref 0–2)
EOSINOPHIL # BLD AUTO: 0.03 K/UL — SIGNIFICANT CHANGE UP (ref 0–0.5)
EOSINOPHIL NFR BLD AUTO: 0.2 % — SIGNIFICANT CHANGE UP (ref 0–6)
HCT VFR BLD CALC: 35.6 % — SIGNIFICANT CHANGE UP (ref 34.5–45)
HGB BLD-MCNC: 11.4 G/DL — LOW (ref 11.5–15.5)
IMM GRANULOCYTES NFR BLD AUTO: 0.6 % — SIGNIFICANT CHANGE UP (ref 0–1.5)
LYMPHOCYTES # BLD AUTO: 18.1 % — SIGNIFICANT CHANGE UP (ref 13–44)
LYMPHOCYTES # BLD AUTO: 2.86 K/UL — SIGNIFICANT CHANGE UP (ref 1–3.3)
MCHC RBC-ENTMCNC: 25.1 PG — LOW (ref 27–34)
MCHC RBC-ENTMCNC: 32 GM/DL — SIGNIFICANT CHANGE UP (ref 32–36)
MCV RBC AUTO: 78.2 FL — LOW (ref 80–100)
MONOCYTES # BLD AUTO: 0.85 K/UL — SIGNIFICANT CHANGE UP (ref 0–0.9)
MONOCYTES NFR BLD AUTO: 5.4 % — SIGNIFICANT CHANGE UP (ref 2–14)
NEUTROPHILS # BLD AUTO: 11.92 K/UL — HIGH (ref 1.8–7.4)
NEUTROPHILS NFR BLD AUTO: 75.5 % — SIGNIFICANT CHANGE UP (ref 43–77)
NRBC # BLD: 0 /100 WBCS — SIGNIFICANT CHANGE UP (ref 0–0)
PLATELET # BLD AUTO: 244 K/UL — SIGNIFICANT CHANGE UP (ref 150–400)
RBC # BLD: 4.55 M/UL — SIGNIFICANT CHANGE UP (ref 3.8–5.2)
RBC # FLD: 14.6 % — HIGH (ref 10.3–14.5)
WBC # BLD: 15.79 K/UL — HIGH (ref 3.8–10.5)
WBC # FLD AUTO: 15.79 K/UL — HIGH (ref 3.8–10.5)

## 2020-04-01 PROCEDURE — 59050 FETAL MONITOR W/REPORT: CPT

## 2020-04-01 PROCEDURE — 86900 BLOOD TYPING SEROLOGIC ABO: CPT

## 2020-04-01 PROCEDURE — 85384 FIBRINOGEN ACTIVITY: CPT

## 2020-04-01 PROCEDURE — 86850 RBC ANTIBODY SCREEN: CPT

## 2020-04-01 PROCEDURE — 86901 BLOOD TYPING SEROLOGIC RH(D): CPT

## 2020-04-01 PROCEDURE — 59025 FETAL NON-STRESS TEST: CPT

## 2020-04-01 PROCEDURE — 85610 PROTHROMBIN TIME: CPT

## 2020-04-01 PROCEDURE — 86780 TREPONEMA PALLIDUM: CPT

## 2020-04-01 PROCEDURE — 85730 THROMBOPLASTIN TIME PARTIAL: CPT

## 2020-04-01 PROCEDURE — 36415 COLL VENOUS BLD VENIPUNCTURE: CPT

## 2020-04-01 PROCEDURE — 84112 EVAL AMNIOTIC FLUID PROTEIN: CPT

## 2020-04-01 PROCEDURE — G0463: CPT

## 2020-04-01 PROCEDURE — 99238 HOSP IP/OBS DSCHRG MGMT 30/<: CPT

## 2020-04-01 PROCEDURE — 85027 COMPLETE CBC AUTOMATED: CPT

## 2020-04-01 RX ORDER — IBUPROFEN 200 MG
1 TABLET ORAL
Qty: 40 | Refills: 2
Start: 2020-04-01 | End: 2020-04-30

## 2020-04-01 RX ORDER — CHOLECALCIFEROL (VITAMIN D3) 125 MCG
0 CAPSULE ORAL
Qty: 0 | Refills: 0 | DISCHARGE

## 2020-04-01 RX ORDER — DOCUSATE SODIUM 100 MG
1 CAPSULE ORAL
Qty: 60 | Refills: 0
Start: 2020-04-01 | End: 2020-04-30

## 2020-04-01 RX ADMIN — Medication 1 TABLET(S): at 12:02

## 2020-04-01 RX ADMIN — SODIUM CHLORIDE 3 MILLILITER(S): 9 INJECTION INTRAMUSCULAR; INTRAVENOUS; SUBCUTANEOUS at 14:29

## 2020-04-01 RX ADMIN — Medication 975 MILLIGRAM(S): at 12:03

## 2020-04-01 NOTE — DISCHARGE NOTE OB - HOSPITAL COURSE
Pt admitted with PPROM at 39.1 weeks s/p nvsd. post partum care and breastfeeding instructions provided. normal couse of labor and delivery

## 2020-04-01 NOTE — PROGRESS NOTE ADULT - SUBJECTIVE AND OBJECTIVE BOX
Patient seen at bedside resting comfortably, offers no current complaints.  Ambulating and voiding without difficulty.  Passing flatus and tolerating regular diet.  both breast/bottle feeding.  Denies HA, CP, SOB, N/V/D, dizziness, palpitations, worsening abdominal pain, worsening vaginal bleeding, or any other concerns.      Vital Signs Last 24 Hrs  T(C): 36.6 (2020 05:29), Max: 36.8 (31 Mar 2020 13:50)  T(F): 97.9 (2020 05:29), Max: 98.3 (31 Mar 2020 18:00)  HR: 74 (2020 05:29) (74 - 87)  BP: 111/66 (2020 05:29) (98/58 - 113/76)  BP(mean): --  RR: 16 (2020 05:29) (16 - 16)  SpO2: 98% (2020 05:29) (98% - 100%)    Physical Exam:   Gen: A&Ox 3, NAD  Chest: CTA B/L  Cardiac: S1,S2; RRR  Breast: Soft, nontender, nonengorged  Abdomen: +BS; Soft, nontender, ND; Fundus firm below umbilicus  Gyn: Min lochia  Ext: Nontender, DTRS 2+, no worsening edema                          11.4   15.79 )-----------( 244      ( 2020 07:24 )             35.6          A/P: PPD#2 s/p  at 39.1wks   -Discharge home with instructions  -Follow up in office in 5-6 weeks for postpartum visit  -Breastfeeding encouraged   -d/w Dr. Aranda

## 2020-04-01 NOTE — DISCHARGE NOTE OB - PATIENT PORTAL LINK FT
You can access the FollowMyHealth Patient Portal offered by [facility] by registering at the following website: [email or web address]. By joining [facility]’s FollowMyHealth portal, you will also be able to view your health information using other applications (apps) compatible with our system.

## 2020-04-01 NOTE — DISCHARGE NOTE OB - CARE PLAN
Principal Discharge DX:	 (normal spontaneous vaginal delivery)  Goal:	post partum care, pain management, breastfeeding  Assessment and plan of treatment:	No sex, no pushing, no heavy lifting, no strenuous activity, Nothing per vagina x  6 weeks - no sex, tampons, douching, tub baths, etc. Continue breastfeeding.  Motrin as needed for pain. Follow up in office in 5-6 weeks for post partum check up. Please call for appt.

## 2020-04-01 NOTE — DISCHARGE NOTE OB - CARE PROVIDER_API CALL
Atul Monreal)  Obstetrics and Gynecology  [address], [address] [place] [address] [place] [address]  Phone: [phone]  Fax: [phone]  Follow Up Time:

## 2020-04-01 NOTE — DISCHARGE NOTE OB - MEDICATION SUMMARY - MEDICATIONS TO TAKE
I will START or STAY ON the medications listed below when I get home from the hospital:    ibuprofen 600 mg oral tablet  -- 1 tab(s) by mouth every 6 hours  -- Do not take this drug if you are pregnant.  It is very important that you take or use this exactly as directed.  Do not skip doses or discontinue unless directed by your doctor.  May cause drowsiness or dizziness.  Obtain medical advice before taking any non-prescription drugs as some may affect the action of this medication.  Take with food or milk.    -- Indication: For pain    Colace 100 mg oral capsule  -- 1 cap(s) by mouth 2 times a day  -- Medication should be taken with plenty of water.    -- Indication: For constipaiton

## 2020-04-01 NOTE — PROGRESS NOTE ADULT - PROBLEM SELECTOR PLAN 1
-Discharge home with instructions  -Follow up in office in 5-6 weeks for postpartum visit  -Breastfeeding encouraged   -d/w Dr. Aranda

## 2020-04-07 ENCOUNTER — APPOINTMENT (OUTPATIENT)
Dept: OBGYN | Facility: CLINIC | Age: 25
End: 2020-04-07

## 2020-05-04 ENCOUNTER — APPOINTMENT (OUTPATIENT)
Dept: OBGYN | Facility: CLINIC | Age: 25
End: 2020-05-04
Payer: COMMERCIAL

## 2020-05-04 ENCOUNTER — NON-APPOINTMENT (OUTPATIENT)
Age: 25
End: 2020-05-04

## 2020-05-04 VITALS
OXYGEN SATURATION: 98 % | DIASTOLIC BLOOD PRESSURE: 66 MMHG | HEIGHT: 62 IN | SYSTOLIC BLOOD PRESSURE: 92 MMHG | TEMPERATURE: 97.9 F | BODY MASS INDEX: 21.9 KG/M2 | WEIGHT: 119 LBS

## 2020-05-04 DIAGNOSIS — O99.013 ANEMIA COMPLICATING PREGNANCY, THIRD TRIMESTER: ICD-10-CM

## 2020-05-04 PROCEDURE — 0503F POSTPARTUM CARE VISIT: CPT

## 2020-05-04 RX ORDER — VITAMIN K2 90 MCG
125 MCG CAPSULE ORAL
Qty: 1 | Refills: 2 | Status: COMPLETED | COMMUNITY
Start: 2020-02-10 | End: 2020-05-04

## 2020-05-04 RX ORDER — FOLIC ACID 1 MG/1
1 TABLET ORAL DAILY
Qty: 30 | Refills: 6 | Status: COMPLETED | COMMUNITY
Start: 2020-02-10 | End: 2020-05-04

## 2020-05-04 RX ORDER — PRENATAL VIT 49/IRON FUM/FOLIC 6.75-0.2MG
TABLET ORAL
Refills: 0 | Status: COMPLETED | COMMUNITY
End: 2020-05-04

## 2020-05-04 RX ORDER — FAMOTIDINE 20 MG/1
20 TABLET, FILM COATED ORAL
Qty: 60 | Refills: 1 | Status: COMPLETED | COMMUNITY
Start: 2019-12-30 | End: 2020-05-04

## 2020-05-04 RX ORDER — CHLORHEXIDINE GLUCONATE 4 %
325 (65 FE) LIQUID (ML) TOPICAL TWICE DAILY
Qty: 60 | Refills: 3 | Status: COMPLETED | COMMUNITY
Start: 2019-12-31 | End: 2020-05-04

## 2020-05-04 NOTE — HISTORY OF PRESENT ILLNESS
[Postpartum Follow Up] : postpartum follow up [Complications:___] : complications include: [unfilled] [Last Pap Date: ___] : Last Pap Date: [unfilled] [Delivery Date: ___] : on [unfilled] [Male] : Delivery History: baby boy [] : delivered by vaginal delivery [Wt. ___] : weighing [unfilled] [Breastfeeding] : currently nursing [Vaginal Discharge] : vaginal discharge [Back to Normal] : is back to normal in size [None] : no vaginal bleeding [Normal] : the vagina was normal [Healing Well] : is healing well [Not Done] : Examination of breasts not done [Rhogam] : Rhogam was not administered [Rubella Vaccine] : Rubella vaccine was not administered [Pertussis Vaccine] : Pertussis vaccine was not administered [BTL] : no tubal ligation [Abdominal Pain] : no abdominal pain [Back Pain] : no back pain [BreastFeeding Problems] : no breastfeeding problems [Breast Pain] : no breast pain [Chest Pain] : no chest pain [Cracked Nipples] : no cracked nipples [Episiotomy Site Pain] : no episiotomy site pain [S/Sx PP Depression] : no signs/symptoms of postpartum depression [Heavy Bleeding] : no heavy bleeding [Incisional Drainage] : no incisional drainage [Incisional Pain] : no incisional pain [Leg Pain] : no leg pain [Irregular Bleeding] : no irregular bleeding [Shortness of Breath] : no shortness of breath [Cervix Sample Taken] : cervical sample not taken for a Pap smear [Suicidal Ideation] : no suicidal ideation

## 2020-05-05 LAB
BASOPHILS # BLD AUTO: 0.03 K/UL
BASOPHILS NFR BLD AUTO: 0.5 %
EOSINOPHIL # BLD AUTO: 0.34 K/UL
EOSINOPHIL NFR BLD AUTO: 5.6 %
HCG SERPL-MCNC: 1 MIU/ML
HCT VFR BLD CALC: 41.7 %
HGB BLD-MCNC: 12.5 G/DL
IMM GRANULOCYTES NFR BLD AUTO: 0.2 %
LYMPHOCYTES # BLD AUTO: 2.59 K/UL
LYMPHOCYTES NFR BLD AUTO: 43 %
MAN DIFF?: NORMAL
MCHC RBC-ENTMCNC: 24.7 PG
MCHC RBC-ENTMCNC: 30 GM/DL
MCV RBC AUTO: 82.2 FL
MONOCYTES # BLD AUTO: 0.42 K/UL
MONOCYTES NFR BLD AUTO: 7 %
NEUTROPHILS # BLD AUTO: 2.64 K/UL
NEUTROPHILS NFR BLD AUTO: 43.7 %
PLATELET # BLD AUTO: 277 K/UL
RBC # BLD: 5.07 M/UL
RBC # FLD: 13.8 %
WBC # FLD AUTO: 6.03 K/UL

## 2020-10-28 NOTE — DISCHARGE NOTE OB - MEDICATION SUMMARY - MEDICATIONS TO STOP TAKING
Wife, Getachew Rodriguez ([phone]) calling to ask for more Malari pills to be sent to [facility] in [place]:  [address] [place] (ph:  [phone]).       Pt's friend is travelling to [place] this weekend and will p/u from [facility] and bring with I will STOP taking the medications listed below when I get home from the hospital:  None

## 2021-03-23 ENCOUNTER — APPOINTMENT (OUTPATIENT)
Dept: OBGYN | Facility: CLINIC | Age: 26
End: 2021-03-23
Payer: COMMERCIAL

## 2021-03-23 VITALS
HEART RATE: 85 BPM | TEMPERATURE: 97.6 F | WEIGHT: 132 LBS | HEIGHT: 62 IN | OXYGEN SATURATION: 98 % | BODY MASS INDEX: 24.29 KG/M2 | DIASTOLIC BLOOD PRESSURE: 50 MMHG | SYSTOLIC BLOOD PRESSURE: 92 MMHG

## 2021-03-23 PROCEDURE — 99395 PREV VISIT EST AGE 18-39: CPT

## 2021-03-23 PROCEDURE — 99072 ADDL SUPL MATRL&STAF TM PHE: CPT

## 2021-03-23 RX ORDER — PRENATAL VIT NO.130/IRON/FOLIC 27MG-0.8MG
27-0.8 TABLET ORAL DAILY
Qty: 30 | Refills: 6 | Status: COMPLETED | COMMUNITY
Start: 2020-02-10 | End: 2021-03-23

## 2021-03-23 RX ORDER — NORETHINDRONE 0.35 MG/1
0.35 TABLET ORAL DAILY
Qty: 1 | Refills: 2 | Status: COMPLETED | COMMUNITY
Start: 2020-05-04 | End: 2021-03-23

## 2021-03-23 NOTE — PLAN
[FreeTextEntry1] : risks&benefits of ocps are explained to patient .she understands her condition&agreed for using pills

## 2021-03-23 NOTE — HISTORY OF PRESENT ILLNESS
[Normal Amount/Duration] :  normal amount and duration [Regular Cycle Intervals] : periods have been regular [Menarche Age: ____] : age at menarche was [unfilled] [FreeTextEntry1] : 03/01/2021 [Currently Active] : currently active [Men] : men [Vaginal] : vaginal [No] : No

## 2021-03-24 LAB
C TRACH RRNA SPEC QL NAA+PROBE: NOT DETECTED
N GONORRHOEA RRNA SPEC QL NAA+PROBE: NOT DETECTED
SOURCE TP AMPLIFICATION: NORMAL

## 2021-03-26 LAB — CYTOLOGY CVX/VAG DOC THIN PREP: NORMAL

## 2021-06-17 ENCOUNTER — APPOINTMENT (OUTPATIENT)
Dept: OBGYN | Facility: CLINIC | Age: 26
End: 2021-06-17
Payer: COMMERCIAL

## 2021-06-17 VITALS
BODY MASS INDEX: 23.74 KG/M2 | HEIGHT: 62 IN | HEART RATE: 77 BPM | TEMPERATURE: 97.5 F | OXYGEN SATURATION: 98 % | SYSTOLIC BLOOD PRESSURE: 104 MMHG | DIASTOLIC BLOOD PRESSURE: 70 MMHG | WEIGHT: 129 LBS

## 2021-06-17 DIAGNOSIS — Z87.2 PERSONAL HISTORY OF DISEASES OF THE SKIN AND SUBCUTANEOUS TISSUE: ICD-10-CM

## 2021-06-17 PROCEDURE — 99072 ADDL SUPL MATRL&STAF TM PHE: CPT

## 2021-06-17 PROCEDURE — 81025 URINE PREGNANCY TEST: CPT

## 2021-06-17 PROCEDURE — 58300 INSERT INTRAUTERINE DEVICE: CPT | Mod: 59

## 2021-06-17 PROCEDURE — 99213 OFFICE O/P EST LOW 20 MIN: CPT | Mod: 25

## 2021-06-17 RX ORDER — NORGESTIMATE AND ETHINYL ESTRADIOL 7DAYSX3 28
0.18/0.215/0.25 KIT ORAL DAILY
Qty: 1 | Refills: 5 | Status: DISCONTINUED | COMMUNITY
Start: 2021-03-23 | End: 2021-06-17

## 2021-06-17 NOTE — PROCEDURE
[IUD Placement] : intrauterine device (IUD) placement [Time out performed] : Pre-procedure time out performed.  Patient's name, date of birth and procedure confirmed. [Consent Obtained] : Consent obtained [Prevention of Pregnancy] : prevention of pregnancy [Risks] : risks [Benefits] : benefits [Patient] : patient [Infection] : infection [Bleeding] : bleeding [Pain] : pain [Expulsion] : expulsion [Failure] : failure [Uterine Perforation] : uterine perforation [Neg Pregnancy Test] : negative pregnancy test [No Premedication] : No premedication [Neg GC/Chlamydia] : negative GC/Chlamydia [Tenaculum] : Tenaculum [Easy Passage] : Easy passage [Sounded to ___ cm] : sounded to [unfilled] ~Ucm [Post Placement Transvag. US] : post placement transvaginal ultrasound [ParaGard IUD] : ParaGard IUD [Tolerated Well] : Patient tolerated the procedure well [No Complications] : No complications [None] : None [LMPDate] : 05/27/2021 [de-identified] : transvaginal sonogram confirmed intrauterine placement of iud [de-identified] : [id number] [de-identified] : 01/2027 [de-identified] : 01/30/2037

## 2021-06-17 NOTE — HISTORY OF PRESENT ILLNESS
[Normal Amount/Duration] :  normal amount and duration [Regular Cycle Intervals] : periods have been regular [Menarche Age: ____] : age at menarche was [unfilled] [FreeTextEntry1] : 05/27/2021 [Currently Active] : currently active [Vaginal] : vaginal [Men] : men [No] : No

## 2021-06-24 ENCOUNTER — APPOINTMENT (OUTPATIENT)
Dept: INTERNAL MEDICINE | Facility: CLINIC | Age: 26
End: 2021-06-24
Payer: COMMERCIAL

## 2021-06-24 VITALS
WEIGHT: 129 LBS | HEIGHT: 62 IN | BODY MASS INDEX: 23.74 KG/M2 | DIASTOLIC BLOOD PRESSURE: 72 MMHG | RESPIRATION RATE: 16 BRPM | SYSTOLIC BLOOD PRESSURE: 104 MMHG | TEMPERATURE: 97.1 F | OXYGEN SATURATION: 99 % | HEART RATE: 74 BPM

## 2021-06-24 DIAGNOSIS — M54.16 RADICULOPATHY, LUMBAR REGION: ICD-10-CM

## 2021-06-24 DIAGNOSIS — M79.18 MYALGIA, OTHER SITE: ICD-10-CM

## 2021-06-24 PROCEDURE — 99203 OFFICE O/P NEW LOW 30 MIN: CPT

## 2021-06-24 PROCEDURE — 99072 ADDL SUPL MATRL&STAF TM PHE: CPT

## 2021-06-24 NOTE — HISTORY OF PRESENT ILLNESS
[FreeTextEntry8] : CHLOE SONG is a 26 year old female patient with a PMHx of anemia during pregnancy who presents today who presents today complaining of pain of the lower back for several months. Initially, pain was mild, but worsened recently. Her back pain is present when she sits or stands. \par \par Pt denies frequent urination or pain or urination. She also relates that she had a recent urinalysis done at her OBGYN which was wnl.

## 2021-06-24 NOTE — PAST MEDICAL HISTORY
[Menstruating] : hx of menstruating [Definite ___ (Date)] : the last menstrual period was [unfilled] [Regular Cycle Intervals] : have been regular [FreeTextEntry1] : IUD placed June 17, 2021

## 2021-06-24 NOTE — END OF VISIT
[FreeTextEntry3] : I, Mer Langley, personally transcribed these services in the presence of Dr. Warren Moran MD. I, Dr. Warren Moran MD, personally performed the services described in this documentation as scribed by Mer Langley in my presence and it is both accurate and complete.

## 2021-06-24 NOTE — PLAN
[FreeTextEntry1] : X-ray L-S spine\par \par Naproxen 375mg PO BID prn pain\par \par Flexeril 5mg PO QHS prn pain\par \par RTO for fasting blood tests

## 2021-06-24 NOTE — PHYSICAL EXAM
[No Acute Distress] : no acute distress [Well Nourished] : well nourished [Well Developed] : well developed [Well-Appearing] : well-appearing [Normal Sclera/Conjunctiva] : normal sclera/conjunctiva [PERRL] : pupils equal round and reactive to light [EOMI] : extraocular movements intact [Normal Oropharynx] : the oropharynx was normal [Normal Outer Ear/Nose] : the outer ears and nose were normal in appearance [No JVD] : no jugular venous distention [No Lymphadenopathy] : no lymphadenopathy [Supple] : supple [Thyroid Normal, No Nodules] : the thyroid was normal and there were no nodules present [No Respiratory Distress] : no respiratory distress  [No Accessory Muscle Use] : no accessory muscle use [Clear to Auscultation] : lungs were clear to auscultation bilaterally [Normal Rate] : normal rate  [Regular Rhythm] : with a regular rhythm [Normal S1, S2] : normal S1 and S2 [No Murmur] : no murmur heard [No Carotid Bruits] : no carotid bruits [No Abdominal Bruit] : a ~M bruit was not heard ~T in the abdomen [No Varicosities] : no varicosities [Pedal Pulses Present] : the pedal pulses are present [No Edema] : there was no peripheral edema [No Palpable Aorta] : no palpable aorta [No Extremity Clubbing/Cyanosis] : no extremity clubbing/cyanosis [Normal Appearance] : normal in appearance [No Nipple Discharge] : no nipple discharge [No Axillary Lymphadenopathy] : no axillary lymphadenopathy [Soft] : abdomen soft [Non Tender] : non-tender [Non-distended] : non-distended [No Masses] : no abdominal mass palpated [No HSM] : no HSM [Normal Bowel Sounds] : normal bowel sounds [Normal Posterior Cervical Nodes] : no posterior cervical lymphadenopathy [Normal Anterior Cervical Nodes] : no anterior cervical lymphadenopathy [No CVA Tenderness] : no CVA  tenderness [No Spinal Tenderness] : no spinal tenderness [No Joint Swelling] : no joint swelling [Grossly Normal Strength/Tone] : grossly normal strength/tone [No Rash] : no rash [No Focal Deficits] : no focal deficits [Coordination Grossly Intact] : coordination grossly intact [Normal Gait] : normal gait [Deep Tendon Reflexes (DTR)] : deep tendon reflexes were 2+ and symmetric [Normal Affect] : the affect was normal [Normal Insight/Judgement] : insight and judgment were intact [FreeTextEntry1] : n/a [de-identified] : tenderness to palpation

## 2021-07-14 ENCOUNTER — APPOINTMENT (OUTPATIENT)
Dept: OBGYN | Facility: CLINIC | Age: 26
End: 2021-07-14
Payer: COMMERCIAL

## 2021-07-14 VITALS
TEMPERATURE: 98.1 F | WEIGHT: 129 LBS | DIASTOLIC BLOOD PRESSURE: 65 MMHG | HEART RATE: 85 BPM | BODY MASS INDEX: 23.74 KG/M2 | HEIGHT: 62 IN | SYSTOLIC BLOOD PRESSURE: 88 MMHG

## 2021-07-14 DIAGNOSIS — N92.6 IRREGULAR MENSTRUATION, UNSPECIFIED: ICD-10-CM

## 2021-07-14 DIAGNOSIS — Z30.09 ENCOUNTER FOR OTHER GENERAL COUNSELING AND ADVICE ON CONTRACEPTION: ICD-10-CM

## 2021-07-14 PROCEDURE — 99213 OFFICE O/P EST LOW 20 MIN: CPT | Mod: 25

## 2021-07-14 PROCEDURE — 99072 ADDL SUPL MATRL&STAF TM PHE: CPT

## 2021-07-14 PROCEDURE — 81025 URINE PREGNANCY TEST: CPT

## 2021-07-14 NOTE — PLAN
[FreeTextEntry1] : urine pregnancy test came negative.patient advised from [facility] she can see irregular bleeding for 3 months&menorrhagia

## 2021-07-14 NOTE — COUNSELING
[Nutrition/ Exercise/ Weight Management] : nutrition, exercise, weight management [Breast Self Exam] : breast self exam Type Of Destruction Used: Curettage

## 2021-07-14 NOTE — HISTORY OF PRESENT ILLNESS
[Menarche Age: ____] : age at menarche was [unfilled] [FreeTextEntry1] : 05/27/2021 [Currently Active] : currently active [Men] : men [Vaginal] : vaginal [No] : No

## 2021-07-14 NOTE — PHYSICAL EXAM
[Appropriately responsive] : appropriately responsive [Alert] : alert [No Acute Distress] : no acute distress [No Lymphadenopathy] : no lymphadenopathy [Regular Rate Rhythm] : regular rate rhythm [No Murmurs] : no murmurs [Clear to Auscultation B/L] : clear to auscultation bilaterally [Soft] : soft [Non-tender] : non-tender [Non-distended] : non-distended [No HSM] : No HSM [No Lesions] : no lesions [No Mass] : no mass [Oriented x3] : oriented x3 [Examination Of The Breasts] : a normal appearance [No Masses] : no breast masses were palpable [Labia Majora] : normal [Labia Minora] : normal [Discharge] : discharge [Scant] : scant [Foul Smelling] : not foul smelling [White] : white [Thin] : thin [IUD String] : an IUD string was noted [Normal] : normal [Uterine Adnexae] : normal

## 2022-05-02 ENCOUNTER — APPOINTMENT (OUTPATIENT)
Dept: OBGYN | Facility: CLINIC | Age: 27
End: 2022-05-02

## 2022-06-01 ENCOUNTER — APPOINTMENT (OUTPATIENT)
Dept: OBGYN | Facility: CLINIC | Age: 27
End: 2022-06-01

## 2022-06-24 ENCOUNTER — APPOINTMENT (OUTPATIENT)
Dept: DERMATOLOGY | Facility: CLINIC | Age: 27
End: 2022-06-24

## 2022-07-14 ENCOUNTER — APPOINTMENT (OUTPATIENT)
Dept: OBGYN | Facility: CLINIC | Age: 27
End: 2022-07-14

## 2022-07-14 VITALS
BODY MASS INDEX: 25.21 KG/M2 | WEIGHT: 137 LBS | TEMPERATURE: 98.2 F | DIASTOLIC BLOOD PRESSURE: 68 MMHG | HEIGHT: 62 IN | SYSTOLIC BLOOD PRESSURE: 108 MMHG | HEART RATE: 79 BPM

## 2022-07-14 DIAGNOSIS — N89.8 OTHER SPECIFIED NONINFLAMMATORY DISORDERS OF VAGINA: ICD-10-CM

## 2022-07-14 DIAGNOSIS — Z30.430 ENCOUNTER FOR INSERTION OF INTRAUTERINE CONTRACEPTIVE DEVICE: ICD-10-CM

## 2022-07-14 DIAGNOSIS — Z30.432 ENCOUNTER FOR REMOVAL OF INTRAUTERINE CONTRACEPTIVE DEVICE: ICD-10-CM

## 2022-07-14 DIAGNOSIS — Z97.5 PRESENCE OF (INTRAUTERINE) CONTRACEPTIVE DEVICE: ICD-10-CM

## 2022-07-14 PROCEDURE — 99214 OFFICE O/P EST MOD 30 MIN: CPT

## 2022-07-14 RX ORDER — NAPROXEN 375 MG/1
375 TABLET ORAL
Qty: 40 | Refills: 2 | Status: COMPLETED | COMMUNITY
Start: 2021-06-24 | End: 2022-07-14

## 2022-07-14 RX ORDER — CYCLOBENZAPRINE HYDROCHLORIDE 5 MG/1
5 TABLET, FILM COATED ORAL
Qty: 30 | Refills: 1 | Status: COMPLETED | COMMUNITY
Start: 2021-06-24 | End: 2022-07-14

## 2022-07-14 NOTE — PHYSICAL EXAM
[Chaperone Present] : A chaperone was present in the examining room during all aspects of the physical examination [Appropriately responsive] : appropriately responsive [Alert] : alert [No Acute Distress] : no acute distress [No Lymphadenopathy] : no lymphadenopathy [Regular Rate Rhythm] : regular rate rhythm [No Murmurs] : no murmurs [Clear to Auscultation B/L] : clear to auscultation bilaterally [Soft] : soft [Non-tender] : non-tender [Non-distended] : non-distended [No HSM] : No HSM [No Lesions] : no lesions [No Mass] : no mass [Oriented x3] : oriented x3 [Examination Of The Breasts] : a normal appearance [No Masses] : no breast masses were palpable [Normal] : normal [Discharge] : discharge [Moderate] : moderate [Foul Smelling] : not foul smelling [White] : white [Thick] : thick [IUD String] : an IUD string was noted

## 2022-07-14 NOTE — HISTORY OF PRESENT ILLNESS
[Normal Amount/Duration] :  normal amount and duration [Regular Cycle Intervals] : periods have been regular [Menarche Age: ____] : age at menarche was [unfilled] [FreeTextEntry1] : 06/23/2022 [No] : Patient does not have concerns regarding sex [Currently Active] : currently active [Men] : men [Vaginal] : vaginal

## 2022-07-14 NOTE — PROCEDURE
[IUD Removal] : intrauterine device (IUD) removal [Fertility Desired] : fertility desired [Risks] : risks [Benefits] : benefits [Alternatives] : alternatives [Patient] : patient [Speculum Placed] : speculum placed [Strings Visualized] : strings visualized [IUD Discarded] : IUD discarded [Tolerated Well] : Patient tolerated the procedure well [No Complications] : no complications [Heavy Vaginal Bleeding] : for heavy vaginal bleeding [Pelvic Pain] : for pelvic pain [PRN] : as needed

## 2022-07-16 DIAGNOSIS — B96.89 ACUTE VAGINITIS: ICD-10-CM

## 2022-07-16 DIAGNOSIS — N76.0 ACUTE VAGINITIS: ICD-10-CM

## 2022-07-16 LAB
C TRACH RRNA SPEC QL NAA+PROBE: NOT DETECTED
CANDIDA VAG CYTO: NOT DETECTED
G VAGINALIS+PREV SP MTYP VAG QL MICRO: DETECTED
N GONORRHOEA RRNA SPEC QL NAA+PROBE: NOT DETECTED
SOURCE AMPLIFICATION: NORMAL
T VAGINALIS VAG QL WET PREP: NOT DETECTED

## 2022-07-16 RX ORDER — METRONIDAZOLE 7.5 MG/G
0.75 GEL VAGINAL
Qty: 1 | Refills: 1 | Status: ACTIVE | COMMUNITY
Start: 2022-07-16 | End: 1900-01-01

## 2023-05-11 ENCOUNTER — NON-APPOINTMENT (OUTPATIENT)
Age: 28
End: 2023-05-11

## 2023-06-14 ENCOUNTER — NON-APPOINTMENT (OUTPATIENT)
Age: 28
End: 2023-06-14

## 2023-07-10 ENCOUNTER — APPOINTMENT (OUTPATIENT)
Dept: OBGYN | Facility: CLINIC | Age: 28
End: 2023-07-10
Payer: COMMERCIAL

## 2023-07-10 ENCOUNTER — ASOB RESULT (OUTPATIENT)
Age: 28
End: 2023-07-10

## 2023-07-10 VITALS
HEIGHT: 62 IN | BODY MASS INDEX: 24.84 KG/M2 | DIASTOLIC BLOOD PRESSURE: 66 MMHG | HEART RATE: 68 BPM | WEIGHT: 135 LBS | SYSTOLIC BLOOD PRESSURE: 95 MMHG

## 2023-07-10 DIAGNOSIS — O36.80X0 PREGNANCY WITH INCONCLUSIVE FETAL VIABILITY, NOT APPLICABLE OR UNSPECIFIED: ICD-10-CM

## 2023-07-10 PROCEDURE — 76817 TRANSVAGINAL US OBSTETRIC: CPT

## 2023-07-10 PROCEDURE — 99213 OFFICE O/P EST LOW 20 MIN: CPT

## 2023-07-10 NOTE — PHYSICAL EXAM
[Appropriately responsive] : appropriately responsive [Alert] : alert [No Acute Distress] : no acute distress [No Lymphadenopathy] : no lymphadenopathy [Regular Rate Rhythm] : regular rate rhythm [No Murmurs] : no murmurs [Clear to Auscultation B/L] : clear to auscultation bilaterally [Non-tender] : non-tender [Soft] : soft [Non-distended] : non-distended [No HSM] : No HSM [No Lesions] : no lesions [No Mass] : no mass [Oriented x3] : oriented x3

## 2023-07-10 NOTE — PLAN
[FreeTextEntry1] : 27y/o  at 8w3d presents to determine fetal viability.\par \par #PNC \par -1st trimester labs today\par -NIPS discussed\par -Prenatal packet provided and reviewed \par -Cord collection discussed \par -Requisition for NT \par -Rx Zofran and reglan sent\par \par RTO 4 weeks\par jose antonio GALARZA

## 2023-07-12 LAB
ABO + RH PNL BLD: NORMAL
ALBUMIN SERPL ELPH-MCNC: 4.8 G/DL
ALP BLD-CCNC: 66 U/L
ALT SERPL-CCNC: 10 U/L
ANION GAP SERPL CALC-SCNC: 14 MMOL/L
AST SERPL-CCNC: 17 U/L
B19V IGG SER QL IA: 0.7 INDEX
B19V IGG+IGM SER-IMP: NEGATIVE
B19V IGG+IGM SER-IMP: NORMAL
B19V IGM FLD-ACNC: 0.23 INDEX
B19V IGM SER-ACNC: NEGATIVE
BILIRUB SERPL-MCNC: <0.2 MG/DL
BLD GP AB SCN SERPL QL: NORMAL
BUN SERPL-MCNC: 7 MG/DL
C TRACH RRNA SPEC QL NAA+PROBE: NOT DETECTED
CALCIUM SERPL-MCNC: 9.5 MG/DL
CHLORIDE SERPL-SCNC: 100 MMOL/L
CO2 SERPL-SCNC: 24 MMOL/L
CREAT SERPL-MCNC: 0.79 MG/DL
EGFR: 103 ML/MIN/1.73M2
ESTIMATED AVERAGE GLUCOSE: 114 MG/DL
GLUCOSE SERPL-MCNC: 65 MG/DL
HBA1C MFR BLD HPLC: 5.6 %
HBV SURFACE AG SER QL: NONREACTIVE
HCV AB SER QL: NONREACTIVE
HCV S/CO RATIO: 0.08 S/CO
HGB A MFR BLD: 97.3 %
HGB A2 MFR BLD: 2.7 %
HGB FRACT BLD-IMP: NORMAL
HIV1+2 AB SPEC QL IA.RAPID: NONREACTIVE
LEAD BLD-MCNC: <1 UG/DL
M TB IFN-G BLD-IMP: NEGATIVE
MEV IGG FLD QL IA: 277 AU/ML
MEV IGG+IGM SER-IMP: POSITIVE
N GONORRHOEA RRNA SPEC QL NAA+PROBE: NOT DETECTED
POTASSIUM SERPL-SCNC: 4 MMOL/L
PROT SERPL-MCNC: 7.6 G/DL
QUANTIFERON TB PLUS MITOGEN MINUS NIL: >10 IU/ML
QUANTIFERON TB PLUS NIL: 0.05 IU/ML
QUANTIFERON TB PLUS TB1 MINUS NIL: -0.02 IU/ML
QUANTIFERON TB PLUS TB2 MINUS NIL: -0.02 IU/ML
RUBV IGG FLD-ACNC: 3 INDEX
RUBV IGG SER-IMP: POSITIVE
SODIUM SERPL-SCNC: 138 MMOL/L
SOURCE AMPLIFICATION: NORMAL
T GONDII AB SER-IMP: NEGATIVE
T GONDII AB SER-IMP: NEGATIVE
T GONDII IGG SER QL: <3 IU/ML
T GONDII IGM SER QL: <3 AU/ML
T PALLIDUM AB SER QL IA: NEGATIVE
TSH SERPL-ACNC: 3.99 UIU/ML
VZV AB TITR SER: POSITIVE
VZV IGG SER IF-ACNC: 176.6 INDEX

## 2023-07-16 LAB
AR GENE MUT ANL BLD/T: NORMAL
CFTR MUT TESTED BLD/T: NEGATIVE

## 2023-07-18 LAB — FMR1 GENE MUT ANL BLD/T: NORMAL

## 2023-07-24 ENCOUNTER — EMERGENCY (EMERGENCY)
Facility: HOSPITAL | Age: 28
LOS: 1 days | Discharge: HOME | End: 2023-07-24
Admitting: EMERGENCY MEDICINE
Payer: COMMERCIAL

## 2023-07-24 ENCOUNTER — NON-APPOINTMENT (OUTPATIENT)
Age: 28
End: 2023-07-24

## 2023-07-24 VITALS
SYSTOLIC BLOOD PRESSURE: 102 MMHG | DIASTOLIC BLOOD PRESSURE: 63 MMHG | TEMPERATURE: 98 F | OXYGEN SATURATION: 99 % | RESPIRATION RATE: 16 BRPM | HEART RATE: 78 BPM

## 2023-07-24 LAB
ALBUMIN SERPL ELPH-MCNC: 4 G/DL — SIGNIFICANT CHANGE UP (ref 3.3–5)
ALP SERPL-CCNC: 55 U/L — SIGNIFICANT CHANGE UP (ref 40–120)
ALT FLD-CCNC: 8 U/L — SIGNIFICANT CHANGE UP (ref 4–33)
ANION GAP SERPL CALC-SCNC: 14 MMOL/L — SIGNIFICANT CHANGE UP (ref 7–14)
APPEARANCE UR: CLEAR — SIGNIFICANT CHANGE UP
AST SERPL-CCNC: 14 U/L — SIGNIFICANT CHANGE UP (ref 4–32)
BACTERIA # UR AUTO: ABNORMAL /HPF
BASE EXCESS BLDV CALC-SCNC: -0.1 MMOL/L — SIGNIFICANT CHANGE UP (ref -2–3)
BASOPHILS # BLD AUTO: 0.02 K/UL — SIGNIFICANT CHANGE UP (ref 0–0.2)
BASOPHILS NFR BLD AUTO: 0.3 % — SIGNIFICANT CHANGE UP (ref 0–2)
BILIRUB SERPL-MCNC: <0.2 MG/DL — SIGNIFICANT CHANGE UP (ref 0.2–1.2)
BILIRUB UR-MCNC: NEGATIVE — SIGNIFICANT CHANGE UP
BLOOD GAS VENOUS COMPREHENSIVE RESULT: SIGNIFICANT CHANGE UP
BUN SERPL-MCNC: 8 MG/DL — SIGNIFICANT CHANGE UP (ref 7–23)
CALCIUM SERPL-MCNC: 9.1 MG/DL — SIGNIFICANT CHANGE UP (ref 8.4–10.5)
CAST: 0 /LPF — SIGNIFICANT CHANGE UP (ref 0–4)
CHLORIDE BLDV-SCNC: 100 MMOL/L — SIGNIFICANT CHANGE UP (ref 96–108)
CHLORIDE SERPL-SCNC: 100 MMOL/L — SIGNIFICANT CHANGE UP (ref 98–107)
CO2 BLDV-SCNC: 27.3 MMOL/L — HIGH (ref 22–26)
CO2 SERPL-SCNC: 22 MMOL/L — SIGNIFICANT CHANGE UP (ref 22–31)
COLOR SPEC: YELLOW — SIGNIFICANT CHANGE UP
CREAT SERPL-MCNC: 0.67 MG/DL — SIGNIFICANT CHANGE UP (ref 0.5–1.3)
DIFF PNL FLD: NEGATIVE — SIGNIFICANT CHANGE UP
EGFR: 121 ML/MIN/1.73M2 — SIGNIFICANT CHANGE UP
EOSINOPHIL # BLD AUTO: 0.14 K/UL — SIGNIFICANT CHANGE UP (ref 0–0.5)
EOSINOPHIL NFR BLD AUTO: 1.9 % — SIGNIFICANT CHANGE UP (ref 0–6)
GAS PNL BLDV: 134 MMOL/L — LOW (ref 136–145)
GLUCOSE BLDV-MCNC: 92 MG/DL — SIGNIFICANT CHANGE UP (ref 70–99)
GLUCOSE SERPL-MCNC: 95 MG/DL — SIGNIFICANT CHANGE UP (ref 70–99)
GLUCOSE UR QL: NEGATIVE MG/DL — SIGNIFICANT CHANGE UP
HCG SERPL-ACNC: SIGNIFICANT CHANGE UP MIU/ML
HCO3 BLDV-SCNC: 26 MMOL/L — SIGNIFICANT CHANGE UP (ref 22–29)
HCT VFR BLD CALC: 35.4 % — SIGNIFICANT CHANGE UP (ref 34.5–45)
HCT VFR BLDA CALC: 35 % — SIGNIFICANT CHANGE UP (ref 34.5–46.5)
HGB BLD CALC-MCNC: 11.7 G/DL — SIGNIFICANT CHANGE UP (ref 11.7–16.1)
HGB BLD-MCNC: 11.3 G/DL — LOW (ref 11.5–15.5)
IANC: 4.94 K/UL — SIGNIFICANT CHANGE UP (ref 1.8–7.4)
IMM GRANULOCYTES NFR BLD AUTO: 0.3 % — SIGNIFICANT CHANGE UP (ref 0–0.9)
KETONES UR-MCNC: NEGATIVE MG/DL — SIGNIFICANT CHANGE UP
LACTATE BLDV-MCNC: 1 MMOL/L — SIGNIFICANT CHANGE UP (ref 0.5–2)
LEUKOCYTE ESTERASE UR-ACNC: ABNORMAL
LIDOCAIN IGE QN: 48 U/L — SIGNIFICANT CHANGE UP (ref 7–60)
LYMPHOCYTES # BLD AUTO: 1.92 K/UL — SIGNIFICANT CHANGE UP (ref 1–3.3)
LYMPHOCYTES # BLD AUTO: 25.6 % — SIGNIFICANT CHANGE UP (ref 13–44)
MAGNESIUM SERPL-MCNC: 1.9 MG/DL — SIGNIFICANT CHANGE UP (ref 1.6–2.6)
MCHC RBC-ENTMCNC: 23.9 PG — LOW (ref 27–34)
MCHC RBC-ENTMCNC: 31.9 GM/DL — LOW (ref 32–36)
MCV RBC AUTO: 75 FL — LOW (ref 80–100)
MONOCYTES # BLD AUTO: 0.46 K/UL — SIGNIFICANT CHANGE UP (ref 0–0.9)
MONOCYTES NFR BLD AUTO: 6.1 % — SIGNIFICANT CHANGE UP (ref 2–14)
NEUTROPHILS # BLD AUTO: 4.94 K/UL — SIGNIFICANT CHANGE UP (ref 1.8–7.4)
NEUTROPHILS NFR BLD AUTO: 65.8 % — SIGNIFICANT CHANGE UP (ref 43–77)
NITRITE UR-MCNC: NEGATIVE — SIGNIFICANT CHANGE UP
NRBC # BLD: 0 /100 WBCS — SIGNIFICANT CHANGE UP (ref 0–0)
NRBC # FLD: 0 K/UL — SIGNIFICANT CHANGE UP (ref 0–0)
PCO2 BLDV: 47 MMHG — SIGNIFICANT CHANGE UP (ref 39–52)
PH BLDV: 7.35 — SIGNIFICANT CHANGE UP (ref 7.32–7.43)
PH UR: 7.5 — SIGNIFICANT CHANGE UP (ref 5–8)
PLATELET # BLD AUTO: 274 K/UL — SIGNIFICANT CHANGE UP (ref 150–400)
PO2 BLDV: 35 MMHG — SIGNIFICANT CHANGE UP (ref 25–45)
POTASSIUM BLDV-SCNC: 3.9 MMOL/L — SIGNIFICANT CHANGE UP (ref 3.5–5.1)
POTASSIUM SERPL-MCNC: 3.8 MMOL/L — SIGNIFICANT CHANGE UP (ref 3.5–5.3)
POTASSIUM SERPL-SCNC: 3.8 MMOL/L — SIGNIFICANT CHANGE UP (ref 3.5–5.3)
PROT SERPL-MCNC: 6.7 G/DL — SIGNIFICANT CHANGE UP (ref 6–8.3)
PROT UR-MCNC: NEGATIVE MG/DL — SIGNIFICANT CHANGE UP
RBC # BLD: 4.72 M/UL — SIGNIFICANT CHANGE UP (ref 3.8–5.2)
RBC # FLD: 14.3 % — SIGNIFICANT CHANGE UP (ref 10.3–14.5)
RBC CASTS # UR COMP ASSIST: 1 /HPF — SIGNIFICANT CHANGE UP (ref 0–4)
SAO2 % BLDV: 53.6 % — LOW (ref 67–88)
SODIUM SERPL-SCNC: 136 MMOL/L — SIGNIFICANT CHANGE UP (ref 135–145)
SP GR SPEC: 1.02 — SIGNIFICANT CHANGE UP (ref 1–1.03)
SQUAMOUS # UR AUTO: 6 /HPF — HIGH (ref 0–5)
UROBILINOGEN FLD QL: 0.2 MG/DL — SIGNIFICANT CHANGE UP (ref 0.2–1)
WBC # BLD: 7.5 K/UL — SIGNIFICANT CHANGE UP (ref 3.8–10.5)
WBC # FLD AUTO: 7.5 K/UL — SIGNIFICANT CHANGE UP (ref 3.8–10.5)
WBC UR QL: 11 /HPF — HIGH (ref 0–5)

## 2023-07-24 PROCEDURE — 93010 ELECTROCARDIOGRAM REPORT: CPT

## 2023-07-24 PROCEDURE — 99285 EMERGENCY DEPT VISIT HI MDM: CPT

## 2023-07-24 RX ORDER — DIPHENHYDRAMINE HCL 50 MG
25 CAPSULE ORAL ONCE
Refills: 0 | Status: COMPLETED | OUTPATIENT
Start: 2023-07-24 | End: 2023-07-24

## 2023-07-24 RX ORDER — NITROFURANTOIN MACROCRYSTAL 50 MG
100 CAPSULE ORAL ONCE
Refills: 0 | Status: COMPLETED | OUTPATIENT
Start: 2023-07-24 | End: 2023-07-24

## 2023-07-24 RX ORDER — METOCLOPRAMIDE HCL 10 MG
10 TABLET ORAL ONCE
Refills: 0 | Status: COMPLETED | OUTPATIENT
Start: 2023-07-24 | End: 2023-07-24

## 2023-07-24 RX ORDER — NITROFURANTOIN MACROCRYSTAL 50 MG
1 CAPSULE ORAL
Qty: 9 | Refills: 0
Start: 2023-07-24 | End: 2023-07-28

## 2023-07-24 RX ORDER — DOXYLAMINE SUCCINATE 25 MG
1 TABLET ORAL
Qty: 7 | Refills: 0
Start: 2023-07-24 | End: 2023-07-30

## 2023-07-24 RX ORDER — SODIUM CHLORIDE 9 MG/ML
1000 INJECTION, SOLUTION INTRAVENOUS ONCE
Refills: 0 | Status: COMPLETED | OUTPATIENT
Start: 2023-07-24 | End: 2023-07-24

## 2023-07-24 RX ORDER — PYRIDOXINE HCL (VITAMIN B6) 100 MG
25 TABLET ORAL ONCE
Refills: 0 | Status: COMPLETED | OUTPATIENT
Start: 2023-07-24 | End: 2023-07-24

## 2023-07-24 RX ORDER — PYRIDOXINE HCL (VITAMIN B6) 100 MG
1 TABLET ORAL
Qty: 36 | Refills: 0
Start: 2023-07-24 | End: 2023-08-04

## 2023-07-24 RX ADMIN — Medication 25 MILLIGRAM(S): at 18:10

## 2023-07-24 RX ADMIN — SODIUM CHLORIDE 1000 MILLILITER(S): 9 INJECTION, SOLUTION INTRAVENOUS at 15:49

## 2023-07-24 RX ADMIN — Medication 25 MILLIGRAM(S): at 15:49

## 2023-07-24 RX ADMIN — Medication 10 MILLIGRAM(S): at 15:49

## 2023-07-24 RX ADMIN — Medication 100 MILLIGRAM(S): at 19:40

## 2023-07-24 NOTE — ED ADULT NURSE NOTE - DOES PATIENT HAVE ADVANCE DIRECTIVE
No
PHYSICAL EXAM:  GENERAL: NAD, speaks in full sentences, no signs of respiratory distress saturating well on room air   HEAD:  Atraumatic, Normocephalic  EYES: EOMI, PERRLA, conjunctiva and sclera clear  NECK: Supple, No JVD  CHEST/LUNG: Clear to auscultation bilaterally; No wheeze; No crackles; No accessory muscles used  HEART: Regular rate and rhythm; No murmurs;   ABDOMEN: Soft, Nontender, Nondistended; Bowel sounds present; No guarding  EXTREMITIES:  2+ Peripheral Pulses, No cyanosis or edema  : Right sided flank pain noted, ramirez noted to be draining clear yellow urine no blood or clots noted   PSYCH: AAOx2-3   NEUROLOGY: non-focal  SKIN: No rashes or lesions

## 2023-07-24 NOTE — ED PROVIDER NOTE - PATIENT PORTAL LINK FT
You can access the FollowMyHealth Patient Portal offered by [facility] by registering at the following website: [email or web address]. By joining [facility]’s FollowMyHealth portal, you will also be able to view your health information using other applications (apps) compatible with our system.

## 2023-07-24 NOTE — ED PROVIDER NOTE - OBJECTIVE STATEMENT
From: Viviana Harden  To: Garrison KIET Dejonjelenaclari  Sent: 12/29/2022 10:06 PM CST  Subject: Morning sickness    Hi there! I will be seeing you next week for my first prenatal appointment! I’m going on 48 hours with extreme morning sickness. I haven’t been able to stomach a meal or medicine in a few days. I’ve tried Dramamine twice and could not keep it down. I’ve tried apple sauce and crackers. I’ve also tried to drink smoothies and nothing will stay down. When does this become a concern and is an ER visit necessary? Thank you!  
Message left to call office.  
27 y/o F  (LMP 5/8 about 10 weeks pregnant) presents to ED with daily nausea, vomiting and dizziness x 3 weeks. Patient denies any  complaints. No fever, chills, abd pain, vaginal bleeding/discharge. Reports about 3 episodes per day of emesis shortly after drinking/eating. Reports being seen her OBGYN 2 weeks ago where she has normal sonogram and was prescribed reglan and zofran. Patient states she has tried taking both medications and did not get improvement in her symptoms. No CP, SOB, palpitations.

## 2023-07-24 NOTE — ED PROVIDER NOTE - CLINICAL SUMMARY MEDICAL DECISION MAKING FREE TEXT BOX
27 y/o  pregnant female presents with nausea, vomiting, dizziness x weeks concerning for hyperemesis gravidarum. ddx includes electrolyte disturbance. 27 y/o  pregnant female presents with nausea, vomiting, dizziness x weeks concerning for hyperemesis gravidarum. ddx includes electrolyte disturbance. Labs WNL. Urine with leuk esterase, WBC, bacteria - will treat with macrobid.     Given B6, zofran, benadryl with resolution of nausea and vomiting. Discussed with OBGYN resident.     Patient is in agreement with plan for outpatient f/u and supportive tx. Strict return precautions discussed

## 2023-07-24 NOTE — ED PROVIDER NOTE - PROGRESS NOTE DETAILS
Pt reports feeding tube fell out a week ago. Pt did not want it replaced. Pt has been dressing site. Reports increased drainage and pain over the last day. Pt seen earlier in ed for same. patient tolerated apple juice and reports significant improvement in symptoms. Discussed with OBGYN resident who advises macrobid for asymptomatic bacturia and outpatient follow up with Dr. Jennings.

## 2023-07-24 NOTE — ED PROVIDER NOTE - NSFOLLOWUPINSTRUCTIONS_ED_ALL_ED_FT
Hyperemesis gravidarum is a severe form of nausea and vomiting that happens during pregnancy. Hyperemesis is worse than morning sickness. It may cause you to have nausea or vomiting all day for many days. It may keep you from eating and drinking enough food and liquids, which can lead to dehydration, malnutrition, and weight loss. Hyperemesis usually occurs during the first half (the first 20 weeks) of pregnancy. It often goes away once a woman is in her second half of pregnancy. However, sometimes hyperemesis continues through an entire pregnancy.    What are the causes?  The cause of this condition is not known. It may be associated with:  Changes in hormones in the body during pregnancy.  Changes in the gastrointestinal system.  Genetic or inherited conditions.  What are the signs or symptoms?  Symptoms of this condition include:  Severe nausea and vomiting that does not go away.  Problems keeping food down.  Weight loss.  Loss of body fluid (dehydration).  Loss of appetite. You may have no desire to eat or you may not like the food you have previously enjoyed.  How is this diagnosed?  This condition may be diagnosed based on your medical history, your symptoms, and a physical exam.    You may also have other tests, including:  Blood tests.  Urine tests.  Blood pressure tests.  Ultrasound to look for problems with the placenta or to check if you are pregnant with more than one baby.  How is this treated?  This condition is managed by controlling symptoms. This may include:  Following an eating plan. This can help to lessen nausea and vomiting.  Treatments that do not use medicine. These include acupressure bracelets, hypnosis, and eating or drinking foods or fluids that contain ginger, ginger ale, or ginger tea.  Taking prescription medicine or over-the-counter medicine as told by your health care provider.  Continuing to take prenatal vitamins. You may need to change what kind you take and when you take them. Follow your health care provider's instructions about prenatal vitamins.  An eating plan and medicines are often used together to help control symptoms. If medicines do not help relieve nausea and vomiting, you may need to receive fluids through an IV at the hospital.    Follow these instructions at home:  To help relieve your symptoms, listen to your body. Everyone is different and has different preferences. Find what works best for you. Here are some things you can try to help relieve your symptoms:    Meals and snacks      Eat 5–6 small meals daily instead of 3 large meals. Eating small meals and snacks can help you avoid an empty stomach.  Before getting out of bed, eat a couple of crackers to avoid moving around on an empty stomach.  Eat a protein-rich snack before bed. Examples include cheese and crackers, or a peanut butter sandwich made with 1 slice of whole-wheat bread and 1 tsp (5 g) of peanut butter.  Eat and drink slowly.  Try eating starchy foods as these are usually tolerated well. Examples include cereal, toast, bread, potatoes, pasta, rice, and pretzels.  Eat at least one serving of protein with your meals and snacks. Protein options include lean meats, poultry, seafood, beans, nuts, nut butters, eggs, cheese, and yogurt.  Eat or suck on things that have ginger in them. It may help to relieve nausea. Add ¼ tsp (0.44 g) ground ginger to hot tea, or choose ginger tea.  Fluids    It is important to stay hydrated. Try to:  Drink small amounts of fluids often.  Drink fluids 30 minutes before or after a meal to help lessen the feeling of a full stomach.  Drink 100% fruit juice or an electrolyte drink. An electrolyte drink contains sodium, potassium, and chloride.  Drink fluids that are cold, clear, and carbonated or sour. These include lemonade, ginger ale, lemon–lime soda, ice water, and sparkling water.  Things to avoid    Avoid the following:  Eating foods that trigger your symptoms. These may include spicy foods, coffee, high-fat foods, very sweet foods, and acidic foods.  Drinking more than 1 cup of fluid at a time.  Skipping meals. Nausea can be more intense on an empty stomach. If you cannot tolerate food, do not force it. Try sucking on ice chips or other frozen items and make up for missed calories later.  Lying down within 2 hours after eating.  Being exposed to environmental triggers. These may include food smells, smoky rooms, closed spaces, rooms with strong smells, warm or humid places, overly loud and noisy rooms, and rooms with motion or flickering lights. Try eating meals in a well-ventilated area that is free of strong smells.  Making quick and sudden changes in your movement.  Taking iron pills and multivitamins that contain iron. If you take prescription iron pills, do not stop taking them unless your health care provider approves.  Preparing food. The smell of food can spoil your appetite or trigger nausea.  General instructions    Brush your teeth or use a mouth rinse after meals.  Take over-the-counter and prescription medicines only as told by your health care provider.  Follow instructions from your health care provider about eating or drinking restrictions.  Talk with your health care provider about starting a supplement of vitamin B6.  Continue to take your prenatal vitamins as told by your health care provider. If you are having trouble taking your prenatal vitamins, talk with your health care provider about other options.  Keep all follow-up visits. This is important. Follow-up visits include prenatal visits.  Contact a health care provider if:  You have pain in your abdomen.  You have a severe headache.  You have vision problems.  You are losing weight.  You feel weak or dizzy.  You cannot eat or drink without vomiting, especially if this goes on for a full day.  Get help right away if:  You cannot drink fluids without vomiting.  You vomit blood.  You have constant nausea and vomiting.  You are very weak.  You faint.  You have a fever and your symptoms suddenly get worse.  Summary  Hyperemesis gravidarum is a severe form of nausea and vomiting that happens during pregnancy.  Making some changes to your eating habits may help relieve nausea and vomiting.  This condition may be managed with lifestyle changes and medicines as prescribed by your health care provider.  If medicines do not help relieve nausea and vomiting, you may need to receive fluids through an IV at the hospital.  This information is not intended to replace advice given to you by your health care provider. Make sure you discuss any questions you have with your health care provider.    Document Revised: 07/12/2021 Document Reviewed: 07/12/2021  Elsevier Patient Education © 2023 [employer]

## 2023-07-24 NOTE — ED PROVIDER NOTE - PHYSICAL EXAMINATION
CONSTITUTIONAL: Well appearing, awake, alert, oriented to person, place, time/situation and in no apparent distress.  CARDIAC: Normal rate, regular rhythm.  Heart sounds S1, S2.  No murmurs, rubs or gallops.  RESPIRATORY: Breath sounds clear and equal bilaterally. No accessory muscle use.   GASTROINTESTINAL: Normal bowel sounds. Soft abdomen, no tenderness to palpation.  MUSCULOSKELETAL: No joint tenderness. Spine midline without any midline TTP. Neck supple . No lower extremity edema   NEUROLOGICAL: Alert and oriented, no focal deficits, no motor or sensory deficits. Strength 5/5 B/L in all extremities

## 2023-07-24 NOTE — ED ADULT TRIAGE NOTE - CHIEF COMPLAINT QUOTE
pt LMP 5/8, is 10 weeks pregnant, c/o dizziness x few weeks that got worse over past few days. pt also c/o nausea and vomiting, unable to tolerate PO intake. pt denies vaginal bleeding/ abd cramping.

## 2023-07-24 NOTE — ED ADULT NURSE NOTE - OBJECTIVE STATEMENT
Received pt in bed  A and OX  3 in NAD c/o dizziness nausea and vomiting worse in the morning, abd soft non distended non tender, PERRLA extremities are strong and equal B/l,  orders noted and completed.

## 2023-07-25 LAB
CULTURE RESULTS: SIGNIFICANT CHANGE UP
SPECIMEN SOURCE: SIGNIFICANT CHANGE UP

## 2023-08-10 ENCOUNTER — ASOB RESULT (OUTPATIENT)
Age: 28
End: 2023-08-10

## 2023-08-10 ENCOUNTER — APPOINTMENT (OUTPATIENT)
Dept: ANTEPARTUM | Facility: CLINIC | Age: 28
End: 2023-08-10
Payer: COMMERCIAL

## 2023-08-10 PROCEDURE — 76801 OB US < 14 WKS SINGLE FETUS: CPT

## 2023-08-10 PROCEDURE — 76813 OB US NUCHAL MEAS 1 GEST: CPT

## 2023-08-15 ENCOUNTER — APPOINTMENT (OUTPATIENT)
Dept: OBGYN | Facility: CLINIC | Age: 28
End: 2023-08-15
Payer: COMMERCIAL

## 2023-08-15 VITALS
DIASTOLIC BLOOD PRESSURE: 69 MMHG | HEART RATE: 85 BPM | BODY MASS INDEX: 24.29 KG/M2 | SYSTOLIC BLOOD PRESSURE: 103 MMHG | HEIGHT: 62 IN | WEIGHT: 132 LBS

## 2023-08-15 DIAGNOSIS — Z34.91 ENCOUNTER FOR SUPERVISION OF NORMAL PREGNANCY, UNSPECIFIED, FIRST TRIMESTER: ICD-10-CM

## 2023-08-15 PROCEDURE — 0500F INITIAL PRENATAL CARE VISIT: CPT

## 2023-08-15 RX ORDER — METOCLOPRAMIDE 10 MG/1
10 TABLET ORAL 3 TIMES DAILY
Qty: 90 | Refills: 2 | Status: ACTIVE | COMMUNITY
Start: 2023-07-10 | End: 1900-01-01

## 2023-08-15 RX ORDER — PYRIDOXINE HCL (VITAMIN B6) 25 MG
25 TABLET ORAL
Qty: 90 | Refills: 0 | Status: ACTIVE | COMMUNITY
Start: 2023-08-15 | End: 1900-01-01

## 2023-09-12 ENCOUNTER — APPOINTMENT (OUTPATIENT)
Dept: OBGYN | Facility: CLINIC | Age: 28
End: 2023-09-12
Payer: COMMERCIAL

## 2023-09-12 ENCOUNTER — NON-APPOINTMENT (OUTPATIENT)
Age: 28
End: 2023-09-12

## 2023-09-12 VITALS
BODY MASS INDEX: 24.11 KG/M2 | HEIGHT: 62 IN | SYSTOLIC BLOOD PRESSURE: 98 MMHG | WEIGHT: 131 LBS | HEART RATE: 71 BPM | DIASTOLIC BLOOD PRESSURE: 66 MMHG

## 2023-09-12 DIAGNOSIS — O21.9 VOMITING OF PREGNANCY, UNSPECIFIED: ICD-10-CM

## 2023-09-12 PROCEDURE — 0502F SUBSEQUENT PRENATAL CARE: CPT

## 2023-09-15 LAB
AFP MOM: 0.96
AFP VALUE: 44.9 NG/ML
ALPHA FETOPROTEIN SERUM COMMENT: NORMAL
ALPHA FETOPROTEIN SERUM INTERPRETATION: NORMAL
ALPHA FETOPROTEIN SERUM RESULTS: NORMAL
ALPHA FETOPROTEIN SERUM TEST RESULTS: NORMAL
GESTATIONAL AGE BASED ON: NORMAL
GESTATIONAL AGE ON COLLECTION DATE: 17.6 WEEKS
INSULIN DEP DIABETES: NO
MATERNAL AGE AT EDD AFP: 30.7 YR
MULTIPLE GESTATION: NO
OSBR RISK 1 IN: NORMAL
RACE: NORMAL
WEIGHT AFP: 131 LBS

## 2023-10-03 ENCOUNTER — ASOB RESULT (OUTPATIENT)
Age: 28
End: 2023-10-03

## 2023-10-03 ENCOUNTER — APPOINTMENT (OUTPATIENT)
Dept: ANTEPARTUM | Facility: CLINIC | Age: 28
End: 2023-10-03
Payer: COMMERCIAL

## 2023-10-03 PROCEDURE — 76811 OB US DETAILED SNGL FETUS: CPT

## 2023-10-10 NOTE — ED PROVIDER NOTE - IV ALTEPLASE ADMIN OUTSIDE HIDDEN
Please call and let her know that we can increase the dose from 15mg up to 30mg  Her mychart message (different encounter said it was not working as well)  Did send off - may need prior auth?   I will plan to see her in November   Thanks  PN   show

## 2023-10-17 ENCOUNTER — APPOINTMENT (OUTPATIENT)
Dept: OBGYN | Facility: CLINIC | Age: 28
End: 2023-10-17
Payer: COMMERCIAL

## 2023-10-17 ENCOUNTER — NON-APPOINTMENT (OUTPATIENT)
Age: 28
End: 2023-10-17

## 2023-10-17 VITALS
BODY MASS INDEX: 24.66 KG/M2 | HEART RATE: 103 BPM | SYSTOLIC BLOOD PRESSURE: 110 MMHG | WEIGHT: 134 LBS | HEIGHT: 62 IN | DIASTOLIC BLOOD PRESSURE: 72 MMHG

## 2023-10-17 VITALS — HEIGHT: 62 IN

## 2023-10-17 PROCEDURE — 90682 RIV4 VACC RECOMBINANT DNA IM: CPT

## 2023-10-17 PROCEDURE — 90471 IMMUNIZATION ADMIN: CPT

## 2023-10-17 PROCEDURE — 0502F SUBSEQUENT PRENATAL CARE: CPT

## 2023-10-17 NOTE — OB RN TRIAGE NOTE - NS_GESTAGE_OBGYN_ALL_OB_FT
24w5d Banner Transposition Flap Text: The defect edges were debeveled with a #15 scalpel blade.  Given the location of the defect and the proximity to free margins a Banner transposition flap was deemed most appropriate.  Using a sterile surgical marker, an appropriate flap drawn around the defect. The area thus outlined was incised deep to adipose tissue with a #15 scalpel blade.  The skin margins were undermined to an appropriate distance in all directions utilizing iris scissors.

## 2023-10-18 LAB
HCT VFR BLD CALC: 30.7 %
HGB BLD-MCNC: 9.6 G/DL
MCHC RBC-ENTMCNC: 23.7 PG
MCHC RBC-ENTMCNC: 31.3 GM/DL
MCV RBC AUTO: 75.8 FL
PLATELET # BLD AUTO: 324 K/UL
RBC # BLD: 4.05 M/UL
RBC # FLD: 13.4 %
WBC # FLD AUTO: 9.98 K/UL

## 2023-10-19 ENCOUNTER — OUTPATIENT (OUTPATIENT)
Dept: INPATIENT UNIT | Facility: HOSPITAL | Age: 28
LOS: 1 days | Discharge: HOME | End: 2023-10-19
Payer: COMMERCIAL

## 2023-10-19 DIAGNOSIS — O26.899 OTHER SPECIFIED PREGNANCY RELATED CONDITIONS, UNSPECIFIED TRIMESTER: ICD-10-CM

## 2023-10-19 PROCEDURE — 99221 1ST HOSP IP/OBS SF/LOW 40: CPT

## 2023-10-20 VITALS — SYSTOLIC BLOOD PRESSURE: 99 MMHG | DIASTOLIC BLOOD PRESSURE: 65 MMHG | HEART RATE: 81 BPM

## 2023-10-20 VITALS
HEART RATE: 86 BPM | DIASTOLIC BLOOD PRESSURE: 60 MMHG | RESPIRATION RATE: 17 BRPM | TEMPERATURE: 99 F | SYSTOLIC BLOOD PRESSURE: 107 MMHG

## 2023-10-20 DIAGNOSIS — R10.32 LEFT LOWER QUADRANT PAIN: ICD-10-CM

## 2023-10-20 DIAGNOSIS — O26.892 OTHER SPECIFIED PREGNANCY RELATED CONDITIONS, SECOND TRIMESTER: ICD-10-CM

## 2023-10-20 DIAGNOSIS — Z3A.23 23 WEEKS GESTATION OF PREGNANCY: ICD-10-CM

## 2023-10-20 LAB
APPEARANCE UR: CLEAR — SIGNIFICANT CHANGE UP
APPEARANCE UR: CLEAR — SIGNIFICANT CHANGE UP
BACTERIA # UR AUTO: ABNORMAL /HPF
BACTERIA # UR AUTO: ABNORMAL /HPF
BILIRUB UR-MCNC: NEGATIVE — SIGNIFICANT CHANGE UP
BILIRUB UR-MCNC: NEGATIVE — SIGNIFICANT CHANGE UP
CAST: 1 /LPF — SIGNIFICANT CHANGE UP (ref 0–4)
CAST: 1 /LPF — SIGNIFICANT CHANGE UP (ref 0–4)
COLOR SPEC: YELLOW — SIGNIFICANT CHANGE UP
COLOR SPEC: YELLOW — SIGNIFICANT CHANGE UP
DIFF PNL FLD: NEGATIVE — SIGNIFICANT CHANGE UP
DIFF PNL FLD: NEGATIVE — SIGNIFICANT CHANGE UP
GLUCOSE UR QL: NEGATIVE MG/DL — SIGNIFICANT CHANGE UP
GLUCOSE UR QL: NEGATIVE MG/DL — SIGNIFICANT CHANGE UP
KETONES UR-MCNC: NEGATIVE MG/DL — SIGNIFICANT CHANGE UP
KETONES UR-MCNC: NEGATIVE MG/DL — SIGNIFICANT CHANGE UP
LEUKOCYTE ESTERASE UR-ACNC: ABNORMAL
LEUKOCYTE ESTERASE UR-ACNC: ABNORMAL
NITRITE UR-MCNC: NEGATIVE — SIGNIFICANT CHANGE UP
NITRITE UR-MCNC: NEGATIVE — SIGNIFICANT CHANGE UP
PH UR: 7.5 — SIGNIFICANT CHANGE UP (ref 5–8)
PH UR: 7.5 — SIGNIFICANT CHANGE UP (ref 5–8)
PROT UR-MCNC: NEGATIVE MG/DL — SIGNIFICANT CHANGE UP
PROT UR-MCNC: NEGATIVE MG/DL — SIGNIFICANT CHANGE UP
RBC CASTS # UR COMP ASSIST: 0 /HPF — SIGNIFICANT CHANGE UP (ref 0–4)
RBC CASTS # UR COMP ASSIST: 0 /HPF — SIGNIFICANT CHANGE UP (ref 0–4)
SP GR SPEC: 1.01 — SIGNIFICANT CHANGE UP (ref 1–1.03)
SP GR SPEC: 1.01 — SIGNIFICANT CHANGE UP (ref 1–1.03)
SQUAMOUS # UR AUTO: 14 /HPF — HIGH (ref 0–5)
SQUAMOUS # UR AUTO: 14 /HPF — HIGH (ref 0–5)
UROBILINOGEN FLD QL: 0.2 MG/DL — SIGNIFICANT CHANGE UP (ref 0.2–1)
UROBILINOGEN FLD QL: 0.2 MG/DL — SIGNIFICANT CHANGE UP (ref 0.2–1)
WBC UR QL: 11 /HPF — HIGH (ref 0–5)
WBC UR QL: 11 /HPF — HIGH (ref 0–5)

## 2023-10-20 RX ORDER — ACETAMINOPHEN 500 MG
1000 TABLET ORAL ONCE
Refills: 0 | Status: COMPLETED | OUTPATIENT
Start: 2023-10-20 | End: 2023-10-20

## 2023-10-20 RX ADMIN — Medication 1000 MILLIGRAM(S): at 00:53

## 2023-10-20 NOTE — OB PROVIDER TRIAGE NOTE - HISTORY OF PRESENT ILLNESS
Pt. is a 29y/o  EGA 23wks reports of left groin pain that radiates down to her left leg. Pt. states she has had the pain since the first trimester but since Tuesday the pain has been intense (9/10). Pt. states the pain only happens at night. Pt. denies abdominal cramping, leakage of fluid, vaginal bleeding, and dysuria. Pt. reports good fetal movement.     AP: Denies

## 2023-10-20 NOTE — OB PROVIDER TRIAGE NOTE - NSHPLABSRESULTS_GEN_ALL_CORE
Urinalysis Basic - ( 20 Oct 2023 00:34 )    Color: Yellow / Appearance: Clear / S.007 / pH: x  Gluc: x / Ketone: Negative mg/dL  / Bili: Negative / Urobili: 0.2 mg/dL   Blood: x / Protein: Negative mg/dL / Nitrite: Negative   Leuk Esterase: Moderate / RBC: 0 /HPF / WBC 11 /HPF   Sq Epi: x / Non Sq Epi: 14 /HPF / Bacteria: Few /HPF

## 2023-10-20 NOTE — OB PROVIDER TRIAGE NOTE - NSOBPROVIDERNOTE_OBGYN_ALL_OB_FT
Tylenol 1000mg ordered and given to pt. Tylenol 1000mg ordered and given to pt.    @0150  Pt. states she feels relief after the Tylenol.  No evidence of  labor at this time.   Discussed findings with Dr. Cadena. Pt. d/c'd home. Pt. to follow up with next OB appointment. Pt. instructed to return to triage with increase abdominal contractions, leakage of fluid, vaginal bleeding or decrease fetal movement.

## 2023-10-20 NOTE — OB PROVIDER TRIAGE NOTE - NS_SPECEXAM_OBGYN_ALL_OB
Problem: Falls - Risk of:  Goal: Will remain free from falls  Description: Will remain free from falls  Outcome: Met This Shift
Yes

## 2023-10-20 NOTE — OB RN TRIAGE NOTE - NSNURSINGINSTR_OBGYN_ALL_OB_FT
Patient discharged home. Discharge instructions reviewed with patient by DEBORAH Tsai. All questions answered.

## 2023-10-20 NOTE — OB PROVIDER TRIAGE NOTE - NSHPPHYSICALEXAM_GEN_ALL_CORE
ICU Vital Signs Last 24 Hrs  T(C): 36.8 (20 Oct 2023 00:31), Max: 37 (20 Oct 2023 00:18)  T(F): 98.24 (20 Oct 2023 00:31), Max: 98.6 (20 Oct 2023 00:18)  HR: 89 (20 Oct 2023 00:31) (86 - 89)  BP: 109/70 (20 Oct 2023 00:31) (107/60 - 109/70)  BP(mean): --  ABP: --  ABP(mean): --  RR: 15 (20 Oct 2023 00:31) (15 - 17)  SpO2: --    Abdomen soft nontender  TAS: Breech presentation, posterior placenta, FHR: 145bpm, MVP: 4.73  Images saved to [facility]   Pt. seen and felt movement on sono   Speculum: Cervix appeared closed  TVS: CL: 3.66-3.77 no funneling or dynamic changes noted   No contractions on TOCO

## 2023-11-06 NOTE — DISCHARGE NOTE OB - YES, WALK AS TOLERATED
Subjective:       Patient ID: Lynn Naidu is a 57 y.o. female.    Chief Complaint: Annual Exam (Pt states that she is here for her annual exam )    57-year-old female coming in for annual exam.  She had a CBC and chemistry panel done by hepatology last month and the results were satisfactory.  She is not fasting today.  She has a history of degenerative disc disease of cervical spine with radiculopathy, hypertension, vitamin-D deficiency, severe obesity on we go V and is down 43.5 lb from a year ago.  She has multiple liver cysts being evaluated by hepatology, osteoarthritis of multiple joints, insomnia and plantar fasciitis.  She would a recent upper endoscopy that was positive for Helicobacter pylori and has completed a course of antibiotics for that.  She also was on antibiotics for an upper respiratory infection and subsequently developed a yeast infection.  She was treated with Naftin by ENT and complain that she still was having problems following which they put her on Diflucan 100 mg daily.  She has completed almost all of that and is still complaining that she still has a coating on her tongue.  She has no other oral lesions she has no erythema and she has no pain or soreness.  She also is complaining of some wheezing and some shortness of breath with exertion with a history of mild asthma in the past treated with Singulair.  She did well on the Singulair but has been off of it for a number of years.    Past Medical History:  No date: Allergy  07/17/2015: Bronchitis  No date: Cervical disc displacement  No date: Edema  No date: Hypertension  No date: Insomnia  No date: Liver cyst  No date: Plantar fasciitis    Past Surgical History:  11/01/2017: COLONOSCOPY      Comment:  Dr. Oviedo, normal, ten year recheck  11/01/2017: COLONOSCOPY; N/A      Comment:  Procedure: COLONOSCOPY;  Surgeon: Cameron Oviedo MD;                Location: [facility];  Service: Endoscopy;  Laterality:                 N/A;  07/22/2019: EPIDURAL STEROID INJECTION INTO CERVICAL SPINE; N/A      Comment:  Procedure: Injection-steroid-epidural-cervical;                 Surgeon: Thomas Ivory MD;  Location: [facility] OR;  Service:                Pain Management;  Laterality: N/A;  C7-T1  12/21/2021: EPIDURAL STEROID INJECTION INTO CERVICAL SPINE; N/A      Comment:  Procedure: Injection-steroid-epidural-cervical;                 Surgeon: Thomas Ivory MD;  Location: [facility] OR;  Service:                Pain Management;  Laterality: N/A;  C6-7  08/31/2023: ESOPHAGOGASTRODUODENOSCOPY; N/A      Comment:  Procedure: EGD (ESOPHAGOGASTRODUODENOSCOPY);  Surgeon:                Cameron Oviedo MD;  Location: [facility];  Service:                Endoscopy;  Laterality: N/A;  08/30/2022: INJECTION OF ANESTHETIC AGENT AROUND STELLATE GANGLION;   Left      Comment:  Procedure: BLOCK, GANGLION, STELLATE;  Surgeon: Thomas Ivory MD;  Location: [facility];  Service: Pain Management;                 Laterality: Left;  10/21/2022: INJECTION OF ANESTHETIC AGENT AROUND STELLATE GANGLION;   Left      Comment:  Procedure: BLOCK, GANGLION, STELLATE Left;  Surgeon:                Thomas Ivory MD;  Location: [facility] OR;  Service: Pain                Management;  Laterality: Left;  12/27/2022: INJECTION OF ANESTHETIC AGENT AROUND STELLATE GANGLION;   Left      Comment:  Procedure: BLOCK, GANGLION, STELLATE;  Surgeon: Thomas Ivory MD;  Location: [facility] OR;  Service: Pain Management;                 Laterality: Left;  left  07/23/2021: TONSILLECTOMY, ADENOIDECTOMY; Bilateral      Comment:  Procedure: TONSILLECTOMY AND ADENOIDECTOMY;  Surgeon:                Anselmo Galvan MD;  Location: [facility] OR;  Service: ENT;               Laterality: Bilateral;  No date: UPPER GASTROINTESTINAL ENDOSCOPY  No date: WISDOM TOOTH EXTRACTION    Review of patient's family history indicates:  Problem: Heart disease      Relation: Mother          Age of Onset: (Not  Specified)  Problem: Stroke      Relation: Mother          Age of Onset: (Not Specified)  Problem: Cancer      Relation: Father          Age of Onset: (Not Specified)          Comment: bone ca, prostate ca   Problem: Hypertension      Relation: Sister          Age of Onset: (Not Specified)  Problem: Diabetes      Relation: Sister          Age of Onset: (Not Specified)  Problem: Allergies      Relation: Sister          Age of Onset: (Not Specified)  Problem: Asthma      Relation: Sister          Age of Onset: (Not Specified)  Problem: Hypertension      Relation: Sister          Age of Onset: (Not Specified)  Problem: Allergies      Relation: Sister          Age of Onset: (Not Specified)  Problem: Asthma      Relation: Sister          Age of Onset: (Not Specified)  Problem: Heart disease      Relation: Brother          Age of Onset: (Not Specified)  Problem: Kidney failure      Relation: Brother          Age of Onset: (Not Specified)  Problem: Angioedema      Relation: Neg Hx          Age of Onset: (Not Specified)  Problem: Eczema      Relation: Neg Hx          Age of Onset: (Not Specified)  Problem: Immunodeficiency      Relation: Neg Hx          Age of Onset: (Not Specified)  Problem: Urticaria      Relation: Neg Hx          Age of Onset: (Not Specified)  Problem: Colon cancer      Relation: Neg Hx          Age of Onset: (Not Specified)  Problem: Colon polyps      Relation: Neg Hx          Age of Onset: (Not Specified)  Problem: Esophageal cancer      Relation: Neg Hx          Age of Onset: (Not Specified)  Problem: Stomach cancer      Relation: Neg Hx          Age of Onset: (Not Specified)    Social History    Tobacco Use      Smoking status: Never      Smokeless tobacco: Never    Alcohol use: Never    Drug use: No    Current Outpatient Medications on File Prior to Visit:  ciclopirox (PENLAC) 8 % Soln, APPLY TOPICALLY NIGHTLY, Disp: 7 mL, Rfl: 0  diazePAM (VALIUM) 10 MG Tab, Take 10 mg by mouth every evening.,  Disp: , Rfl: 1  fluconazole (DIFLUCAN) 100 MG tablet, Take 100 mg by mouth., Disp: , Rfl:   FLUoxetine 20 MG capsule, Take 20 mg by mouth once daily., Disp: , Rfl: 2  fluticasone propionate (FLONASE) 50 mcg/actuation nasal spray, 2 sprays (100 mcg total) by Each Nostril route once daily., Disp: 48 g, Rfl: 4  hydrOXYzine HCl (ATARAX) 25 MG tablet, Take 1 tablet (25 mg total) by mouth 3 (three) times daily as needed. (Patient taking differently: Take 25 mg by mouth once daily.), Disp: 90 tablet, Rfl: 1  hydrOXYzine pamoate (VISTARIL) 25 MG Cap, Take 25 mg by mouth 2 (two) times daily as needed., Disp: , Rfl:   methocarbamoL (ROBAXIN) 750 MG Tab, Take 1 tablet (750 mg total) by mouth 4 (four) times daily as needed (muscle spasm)., Disp: 90 tablet, Rfl: 1  nabumetone (RELAFEN) 500 MG tablet, Take 1 tablet (500 mg total) by mouth 2 (two) times daily as needed for Pain., Disp: 30 tablet, Rfl: 0  pantoprazole (PROTONIX) 40 MG tablet, Take 1 tablet (40 mg total) by mouth every morning., Disp: 90 tablet, Rfl: 3  pregabalin (LYRICA) 75 MG capsule, TAKE 1 CAPSULE BY MOUTH EVERY MORNING AND 2 CAPSULES AT BEDTIME, Disp: 90 capsule, Rfl: 5  promethazine (PHENERGAN) 25 MG tablet, Take 25 mg by mouth., Disp: , Rfl:   triamcinolone acetonide 0.1% (KENALOG) 0.1 % paste, SMARTSIG:Sparingly TO TEETH 3 Times Daily, Disp: , Rfl:   [DISCONTINUED] ergocalciferol (ERGOCALCIFEROL) 50,000 unit Cap, TAKE 1 CAPSULE BY MOUTH TWICE WEEKLY, Disp: 24 capsule, Rfl: 0  [DISCONTINUED] furosemide (LASIX) 40 MG tablet, Take 1 tablet (40 mg total) by mouth once daily., Disp: 90 tablet, Rfl: 3  [DISCONTINUED] linaCLOtide (LINZESS) 290 mcg Cap capsule, Take 1 capsule by mouth once daily, Disp: 90 capsule, Rfl: 3  [DISCONTINUED] metoprolol succinate (TOPROL-XL) 50 MG 24 hr tablet, Take 1 tablet (50 mg total) by mouth once daily., Disp: 90 tablet, Rfl: 3  [DISCONTINUED] potassium chloride SA (K-DUR,KLOR-CON) 20 MEQ tablet, TAKE 1  BY MOUTH TWICE DAILY, Disp:  180 tablet, Rfl: 0  [DISCONTINUED] WEGOVY 2.4 mg/0.75 mL PnIj, INJECT 2.4 MG INTO THE SKIN EVERY 7 DAYS, Disp: 12 each, Rfl: 0  methylPREDNISolone (MEDROL DOSEPACK) 4 mg tablet, Take by mouth., Disp: , Rfl:   SULFACLEANSE 8-4 8-4 % Susp, Apply topically nightly as needed. , Disp: , Rfl:   traMADoL (ULTRAM) 50 mg tablet, Take 1 tablet (50 mg total) by mouth every 8 (eight) hours as needed for Pain. (Patient not taking: Reported on 11/6/2023), Disp: 21 tablet, Rfl: 0    Current Facility-Administered Medications on File Prior to Visit:  lactated ringers infusion, , Intravenous, Continuous, Thomas Ivory MD, Last Rate: 25 mL/hr at 12/21/21 1240, New Bag at 12/21/21 1240  lactated ringers infusion, , Intravenous, Continuous, Thomas Ivory MD            Review of Systems   Constitutional:  Negative for chills, diaphoresis, fatigue, fever and unexpected weight change.   HENT:  Negative for congestion, ear pain, hearing loss, postnasal drip, sinus pressure, sore throat and trouble swallowing.    Eyes:  Negative for itching and visual disturbance.   Respiratory:  Positive for shortness of breath. Negative for cough, chest tightness and wheezing.    Cardiovascular:  Negative for chest pain, palpitations and leg swelling.   Gastrointestinal:  Negative for abdominal pain, blood in stool, constipation, diarrhea, nausea and vomiting.   Genitourinary:  Negative for dysuria, frequency and hematuria.   Musculoskeletal:  Negative for arthralgias, back pain, joint swelling and myalgias.   Neurological:  Negative for dizziness and headaches.   Hematological:  Negative for adenopathy.   Psychiatric/Behavioral:  Negative for sleep disturbance. The patient is not nervous/anxious.        Objective:      Physical Exam  Vitals and nursing note reviewed.   Constitutional:       General: She is not in acute distress.     Appearance: Normal appearance. She is well-developed. She is not ill-appearing, toxic-appearing or diaphoretic.      Comments:  Good blood pressure control   Overweight with a BMI of 29.5 she is down 43.5 lb from October 19, 2022 visit   HENT:      Head: Normocephalic and atraumatic.      Right Ear: Tympanic membrane, ear canal and external ear normal. There is no impacted cerumen.      Left Ear: Tympanic membrane, ear canal and external ear normal. There is no impacted cerumen.      Nose: Nose normal. No congestion or rhinorrhea.      Mouth/Throat:      Mouth: Mucous membranes are moist.      Pharynx: Oropharynx is clear. No oropharyngeal exudate or posterior oropharyngeal erythema.      Comments: White coated tongue with no erythema or ulcerated lesions, no lesions of the soft or hard palate  Eyes:      General: No scleral icterus.        Right eye: No discharge.         Left eye: No discharge.      Conjunctiva/sclera: Conjunctivae normal.      Pupils: Pupils are equal, round, and reactive to light.   Neck:      Thyroid: No thyromegaly.      Vascular: No carotid bruit or JVD.   Cardiovascular:      Rate and Rhythm: Normal rate and regular rhythm.      Heart sounds: Normal heart sounds. No murmur heard.     No friction rub. No gallop.   Pulmonary:      Effort: Pulmonary effort is normal. No respiratory distress.      Breath sounds: Normal breath sounds. No stridor. No wheezing, rhonchi or rales.   Chest:      Chest wall: No tenderness.   Abdominal:      General: Bowel sounds are normal. There is no distension.      Palpations: Abdomen is soft. There is no mass.      Tenderness: There is no abdominal tenderness. There is no guarding or rebound.      Hernia: No hernia is present.   Musculoskeletal:         General: No swelling, tenderness, deformity or signs of injury. Normal range of motion.      Cervical back: Normal range of motion and neck supple. No rigidity or tenderness.      Right lower leg: No edema.      Left lower leg: No edema.   Lymphadenopathy:      Cervical: No cervical adenopathy.   Skin:     General: Skin is warm and dry.       Coloration: Skin is not jaundiced or pale.      Findings: No bruising, erythema, lesion or rash.   Neurological:      General: No focal deficit present.      Mental Status: She is alert and oriented to person, place, and time. Mental status is at baseline.      Cranial Nerves: No cranial nerve deficit.      Sensory: No sensory deficit.      Motor: No weakness.      Coordination: Coordination normal.      Gait: Gait normal.      Deep Tendon Reflexes: Reflexes are normal and symmetric. Reflexes normal.   Psychiatric:         Mood and Affect: Mood normal.         Behavior: Behavior normal.         Thought Content: Thought content normal.         Judgment: Judgment normal.         Assessment:       1. Encounter for preventive health examination    2. Oral candida    3. DDD (degenerative disc disease), cervical    4. Essential hypertension    5. Cervical radiculitis    6. Vitamin D deficiency    7. Mild intermittent reactive airway disease without complication    8. Hypokalemia    9. Severe obesity (BMI 35.0-39.9) with comorbidity    10. Edema, unspecified type    11. Constipation, unspecified constipation type        Plan:       1. Encounter for preventive health examination    2. Oral candida  Appears to be resolved.  The simple coating on the tongue is not responding to brushing.  Suggested she brush with salt and then rinse the mouth thoroughly but do not swallow the salt solution    3. DDD (degenerative disc disease), cervical  Stable with occasional flare ups    4. Essential hypertension  Good control no changes needed  - metoprolol succinate (TOPROL-XL) 50 MG 24 hr tablet; Take 1 tablet (50 mg total) by mouth once daily.  Dispense: 90 tablet; Refill: 3  - Lipid Panel; Future    5. Cervical radiculitis  Stable at present    6. Vitamin D deficiency  Schedule vitamin-D level  - ergocalciferol (ERGOCALCIFEROL) 50,000 unit Cap; Take 1 capsule (50,000 Units total) by mouth twice a week.  Dispense: 24 capsule; Refill:  3  - Vitamin D; Future    7. Mild intermittent reactive airway disease without complication  Trial Singulair 10 mg daily, if she does not improve will get pulmonary function testing  - montelukast (SINGULAIR) 10 mg tablet; Take 1 tablet (10 mg total) by mouth every evening.  Dispense: 90 tablet; Refill: 1    8. Hypokalemia  Lab Results   Component Value Date    K 3.5 10/09/2023     Good level, stay on twice a day potassium chloride  - potassium chloride SA (K-DUR,KLOR-CON) 20 MEQ tablet; Take 1 tablet (20 mEq total) by mouth 2 (two) times daily.  Dispense: 180 tablet; Refill: 3    9. Severe obesity (BMI 35.0-39.9) with comorbidity  Improving on wegovy  - semaglutide, weight loss, (WEGOVY) 2.4 mg/0.75 mL PnIj; Inject 2.4 mg into the skin every 7 days.  Dispense: 12 each; Refill: 1    10. Edema, unspecified type  Controlled, refill Lasix  - furosemide (LASIX) 40 MG tablet; Take 1 tablet (40 mg total) by mouth once daily.  Dispense: 90 tablet; Refill: 3    11. Constipation, unspecified constipation type  Refill Linzess  - linaCLOtide (LINZESS) 290 mcg Cap capsule; Take 1 capsule (290 mcg total) by mouth once daily.  Dispense: 90 capsule; Refill: 3    Patient declined flu vaccine, pneumonia vaccine, shingles vaccine and COVID vaccine                 Statement Selected

## 2023-11-09 ENCOUNTER — APPOINTMENT (OUTPATIENT)
Dept: OBGYN | Facility: CLINIC | Age: 28
End: 2023-11-09

## 2023-11-13 ENCOUNTER — APPOINTMENT (OUTPATIENT)
Dept: OBGYN | Facility: CLINIC | Age: 28
End: 2023-11-13
Payer: COMMERCIAL

## 2023-11-13 VITALS
HEART RATE: 101 BPM | BODY MASS INDEX: 24.84 KG/M2 | WEIGHT: 135 LBS | SYSTOLIC BLOOD PRESSURE: 108 MMHG | HEIGHT: 62 IN | DIASTOLIC BLOOD PRESSURE: 75 MMHG

## 2023-11-13 DIAGNOSIS — Z34.92 ENCOUNTER FOR SUPERVISION OF NORMAL PREGNANCY, UNSPECIFIED, SECOND TRIMESTER: ICD-10-CM

## 2023-11-13 PROCEDURE — 0502F SUBSEQUENT PRENATAL CARE: CPT

## 2023-11-14 LAB
GLUCOSE 1H P 100 G GLC PO SERPL-MCNC: 111 MG/DL
HCT VFR BLD CALC: 30.2 %
HGB BLD-MCNC: 9.3 G/DL
MCHC RBC-ENTMCNC: 23.5 PG
MCHC RBC-ENTMCNC: 30.8 GM/DL
MCV RBC AUTO: 76.3 FL
PLATELET # BLD AUTO: 292 K/UL
RBC # BLD: 3.96 M/UL
RBC # FLD: 15.9 %
WBC # FLD AUTO: 10.88 K/UL

## 2023-11-15 LAB — T PALLIDUM AB SER QL IA: NEGATIVE

## 2023-11-29 ENCOUNTER — APPOINTMENT (OUTPATIENT)
Dept: ANTEPARTUM | Facility: CLINIC | Age: 28
End: 2023-11-29
Payer: COMMERCIAL

## 2023-11-29 ENCOUNTER — ASOB RESULT (OUTPATIENT)
Age: 28
End: 2023-11-29

## 2023-11-29 PROCEDURE — 76816 OB US FOLLOW-UP PER FETUS: CPT

## 2023-11-29 PROCEDURE — 76819 FETAL BIOPHYS PROFIL W/O NST: CPT | Mod: 59

## 2023-12-15 ENCOUNTER — NON-APPOINTMENT (OUTPATIENT)
Age: 28
End: 2023-12-15

## 2023-12-15 ENCOUNTER — APPOINTMENT (OUTPATIENT)
Dept: OBGYN | Facility: CLINIC | Age: 28
End: 2023-12-15
Payer: COMMERCIAL

## 2023-12-15 VITALS
HEART RATE: 101 BPM | SYSTOLIC BLOOD PRESSURE: 121 MMHG | BODY MASS INDEX: 26.13 KG/M2 | HEIGHT: 62 IN | DIASTOLIC BLOOD PRESSURE: 77 MMHG | WEIGHT: 142 LBS

## 2023-12-15 PROCEDURE — 0500F INITIAL PRENATAL CARE VISIT: CPT

## 2024-01-03 ENCOUNTER — APPOINTMENT (OUTPATIENT)
Dept: OBGYN | Facility: CLINIC | Age: 29
End: 2024-01-03
Payer: COMMERCIAL

## 2024-01-03 VITALS
BODY MASS INDEX: 26.31 KG/M2 | HEIGHT: 62 IN | WEIGHT: 143 LBS | DIASTOLIC BLOOD PRESSURE: 62 MMHG | HEART RATE: 96 BPM | SYSTOLIC BLOOD PRESSURE: 99 MMHG

## 2024-01-03 PROCEDURE — 0502F SUBSEQUENT PRENATAL CARE: CPT

## 2024-01-03 RX ORDER — ONDANSETRON 8 MG/1
8 TABLET, ORALLY DISINTEGRATING ORAL EVERY 8 HOURS
Qty: 60 | Refills: 3 | Status: DISCONTINUED | COMMUNITY
Start: 2023-07-10 | End: 2024-01-03

## 2024-01-03 RX ORDER — NORGESTIMATE AND ETHINYL ESTRADIOL 7DAYSX3 28
0.18/0.215/0.25 KIT ORAL DAILY
Qty: 1 | Refills: 5 | Status: DISCONTINUED | COMMUNITY
Start: 2022-07-14 | End: 2024-01-03

## 2024-01-16 ENCOUNTER — APPOINTMENT (OUTPATIENT)
Dept: OBGYN | Facility: CLINIC | Age: 29
End: 2024-01-16
Payer: COMMERCIAL

## 2024-01-16 VITALS
DIASTOLIC BLOOD PRESSURE: 73 MMHG | SYSTOLIC BLOOD PRESSURE: 108 MMHG | WEIGHT: 145 LBS | BODY MASS INDEX: 26.68 KG/M2 | HEIGHT: 62 IN | HEART RATE: 97 BPM

## 2024-01-16 PROCEDURE — 0502F SUBSEQUENT PRENATAL CARE: CPT

## 2024-01-16 RX ORDER — ASCORBIC ACID 500 MG
500 TABLET ORAL DAILY
Qty: 30 | Refills: 3 | Status: ACTIVE | COMMUNITY
Start: 2024-01-16 | End: 1900-01-01

## 2024-01-17 LAB — HIV1+2 AB SPEC QL IA.RAPID: NONREACTIVE

## 2024-01-19 LAB
GP B STREP DNA SPEC QL NAA+PROBE: NOT DETECTED
SOURCE GBS: NORMAL

## 2024-01-26 ENCOUNTER — NON-APPOINTMENT (OUTPATIENT)
Age: 29
End: 2024-01-26

## 2024-01-26 ENCOUNTER — APPOINTMENT (OUTPATIENT)
Dept: OBGYN | Facility: CLINIC | Age: 29
End: 2024-01-26
Payer: COMMERCIAL

## 2024-01-26 VITALS
WEIGHT: 145 LBS | SYSTOLIC BLOOD PRESSURE: 107 MMHG | HEART RATE: 103 BPM | BODY MASS INDEX: 26.68 KG/M2 | HEIGHT: 62 IN | DIASTOLIC BLOOD PRESSURE: 71 MMHG

## 2024-01-26 DIAGNOSIS — M54.30 SCIATICA, UNSPECIFIED SIDE: ICD-10-CM

## 2024-01-26 PROCEDURE — 0502F SUBSEQUENT PRENATAL CARE: CPT

## 2024-02-02 ENCOUNTER — INPATIENT (INPATIENT)
Facility: HOSPITAL | Age: 29
LOS: 1 days | Discharge: HOME | End: 2024-02-04
Attending: STUDENT IN AN ORGANIZED HEALTH CARE EDUCATION/TRAINING PROGRAM | Admitting: STUDENT IN AN ORGANIZED HEALTH CARE EDUCATION/TRAINING PROGRAM
Payer: COMMERCIAL

## 2024-02-02 ENCOUNTER — APPOINTMENT (OUTPATIENT)
Dept: OBGYN | Facility: CLINIC | Age: 29
End: 2024-02-02
Payer: COMMERCIAL

## 2024-02-02 VITALS
HEART RATE: 100 BPM | SYSTOLIC BLOOD PRESSURE: 110 MMHG | RESPIRATION RATE: 16 BRPM | TEMPERATURE: 98 F | DIASTOLIC BLOOD PRESSURE: 61 MMHG

## 2024-02-02 VITALS
BODY MASS INDEX: 27.05 KG/M2 | SYSTOLIC BLOOD PRESSURE: 109 MMHG | HEART RATE: 91 BPM | HEIGHT: 62 IN | DIASTOLIC BLOOD PRESSURE: 69 MMHG | WEIGHT: 147 LBS

## 2024-02-02 DIAGNOSIS — O41.03X0 OLIGOHYDRAMNIOS, THIRD TRIMESTER, NOT APPLICABLE OR UNSPECIFIED: ICD-10-CM

## 2024-02-02 DIAGNOSIS — Z34.93 ENCOUNTER FOR SUPERVISION OF NORMAL PREGNANCY, UNSPECIFIED, THIRD TRIMESTER: ICD-10-CM

## 2024-02-02 DIAGNOSIS — D64.9 ANEMIA, UNSPECIFIED: ICD-10-CM

## 2024-02-02 DIAGNOSIS — O26.899 OTHER SPECIFIED PREGNANCY RELATED CONDITIONS, UNSPECIFIED TRIMESTER: ICD-10-CM

## 2024-02-02 LAB
BASOPHILS # BLD AUTO: 0.02 K/UL — SIGNIFICANT CHANGE UP (ref 0–0.2)
BASOPHILS NFR BLD AUTO: 0.2 % — SIGNIFICANT CHANGE UP (ref 0–2)
EOSINOPHIL # BLD AUTO: 0.07 K/UL — SIGNIFICANT CHANGE UP (ref 0–0.5)
EOSINOPHIL NFR BLD AUTO: 0.8 % — SIGNIFICANT CHANGE UP (ref 0–6)
HCT VFR BLD CALC: 35.4 % — SIGNIFICANT CHANGE UP (ref 34.5–45)
HGB BLD-MCNC: 10.9 G/DL — LOW (ref 11.5–15.5)
IANC: 5.89 K/UL — SIGNIFICANT CHANGE UP (ref 1.8–7.4)
IMM GRANULOCYTES NFR BLD AUTO: 0.7 % — SIGNIFICANT CHANGE UP (ref 0–0.9)
LYMPHOCYTES # BLD AUTO: 1.99 K/UL — SIGNIFICANT CHANGE UP (ref 1–3.3)
LYMPHOCYTES # BLD AUTO: 23.3 % — SIGNIFICANT CHANGE UP (ref 13–44)
MCHC RBC-ENTMCNC: 22.2 PG — LOW (ref 27–34)
MCHC RBC-ENTMCNC: 30.8 GM/DL — LOW (ref 32–36)
MCV RBC AUTO: 72.2 FL — LOW (ref 80–100)
MONOCYTES # BLD AUTO: 0.5 K/UL — SIGNIFICANT CHANGE UP (ref 0–0.9)
MONOCYTES NFR BLD AUTO: 5.9 % — SIGNIFICANT CHANGE UP (ref 2–14)
NEUTROPHILS # BLD AUTO: 5.89 K/UL — SIGNIFICANT CHANGE UP (ref 1.8–7.4)
NEUTROPHILS NFR BLD AUTO: 69.1 % — SIGNIFICANT CHANGE UP (ref 43–77)
NRBC # BLD: 0 /100 WBCS — SIGNIFICANT CHANGE UP (ref 0–0)
NRBC # FLD: 0 K/UL — SIGNIFICANT CHANGE UP (ref 0–0)
PLATELET # BLD AUTO: 255 K/UL — SIGNIFICANT CHANGE UP (ref 150–400)
RBC # BLD: 4.9 M/UL — SIGNIFICANT CHANGE UP (ref 3.8–5.2)
RBC # FLD: 24.3 % — HIGH (ref 10.3–14.5)
WBC # BLD: 8.53 K/UL — SIGNIFICANT CHANGE UP (ref 3.8–10.5)
WBC # FLD AUTO: 8.53 K/UL — SIGNIFICANT CHANGE UP (ref 3.8–10.5)

## 2024-02-02 PROCEDURE — 0502F SUBSEQUENT PRENATAL CARE: CPT

## 2024-02-02 RX ORDER — OXYTOCIN 10 UNIT/ML
VIAL (ML) INJECTION
Qty: 30 | Refills: 0 | Status: DISCONTINUED | OUTPATIENT
Start: 2024-02-02 | End: 2024-02-02

## 2024-02-02 RX ORDER — SODIUM CHLORIDE 9 MG/ML
1000 INJECTION, SOLUTION INTRAVENOUS
Refills: 0 | Status: DISCONTINUED | OUTPATIENT
Start: 2024-02-02 | End: 2024-02-03

## 2024-02-02 RX ORDER — SODIUM CHLORIDE 9 MG/ML
1000 INJECTION, SOLUTION INTRAVENOUS
Refills: 0 | Status: DISCONTINUED | OUTPATIENT
Start: 2024-02-02 | End: 2024-02-02

## 2024-02-02 RX ORDER — CHLORHEXIDINE GLUCONATE 213 G/1000ML
1 SOLUTION TOPICAL DAILY
Refills: 0 | Status: DISCONTINUED | OUTPATIENT
Start: 2024-02-02 | End: 2024-02-03

## 2024-02-02 RX ORDER — OXYTOCIN 10 UNIT/ML
VIAL (ML) INJECTION
Qty: 30 | Refills: 0 | Status: DISCONTINUED | OUTPATIENT
Start: 2024-02-02 | End: 2024-02-03

## 2024-02-02 RX ORDER — OXYTOCIN 10 UNIT/ML
333.33 VIAL (ML) INJECTION
Qty: 20 | Refills: 0 | Status: DISCONTINUED | OUTPATIENT
Start: 2024-02-02 | End: 2024-02-02

## 2024-02-02 RX ORDER — CHLORHEXIDINE GLUCONATE 213 G/1000ML
1 SOLUTION TOPICAL DAILY
Refills: 0 | Status: DISCONTINUED | OUTPATIENT
Start: 2024-02-02 | End: 2024-02-02

## 2024-02-02 RX ORDER — OXYTOCIN 10 UNIT/ML
333.33 VIAL (ML) INJECTION
Qty: 20 | Refills: 0 | Status: DISCONTINUED | OUTPATIENT
Start: 2024-02-02 | End: 2024-02-04

## 2024-02-02 RX ADMIN — Medication 2 MILLIUNIT(S)/MIN: at 21:20

## 2024-02-02 RX ADMIN — CHLORHEXIDINE GLUCONATE 1 APPLICATION(S): 213 SOLUTION TOPICAL at 21:19

## 2024-02-02 NOTE — OB PROVIDER H&P - NSGENETICTESTING_OBGYN_ALL_OB
Continue atorvastatin  Will check lipids and liver functions   continue low-fat low-cholesterol diet and regular exercise   No, other reason

## 2024-02-02 NOTE — OB RN PATIENT PROFILE - NS PRO AD PATIENT TYPE ON CHART
Health Care Proxy (HCP) Cimetidine Counseling:  I discussed with the patient the risks of Cimetidine including but not limited to gynecomastia, headache, diarrhea, nausea, drowsiness, arrhythmias, pancreatitis, skin rashes, psychosis, bone marrow suppression and kidney toxicity.

## 2024-02-02 NOTE — OB PROVIDER H&P - ATTENDING COMMENTS
Pt seen and evaluated at bedside  Agree with above   Admit for oligo  For CB and pitocin     jose antonio GALARZA

## 2024-02-02 NOTE — OB PROVIDER H&P - PROBLEM SELECTOR PLAN 1
Admit for Oligo @38wks  D/W Dr. Jennings  Routine Orders  GBS Negative 1/16  Pitocin for induction with cervical balloon  Pain management PRN, declined at admission

## 2024-02-02 NOTE — OB PROVIDER H&P - NS_OBGYNHISTORY_OBGYN_ALL_OB_FT
GYN: Denies  OB:    3/31/2020 6#5 male FT      AP course uncomplicated  -Anemia- Iron Transfusion x3, last dose 24  -GBS Negative

## 2024-02-02 NOTE — OB RN TRIAGE NOTE - FALL HARM RISK - UNIVERSAL INTERVENTIONS
Bed in lowest position, wheels locked, appropriate side rails in place/Call bell, personal items and telephone in reach/Instruct patient to call for assistance before getting out of bed or chair/Non-slip footwear when patient is out of bed/[place] to call system/Physically safe environment - no spills, clutter or unnecessary equipment/Purposeful Proactive Rounding/Room/bathroom lighting operational, light cord in reach

## 2024-02-02 NOTE — OB PROVIDER LABOR PROGRESS NOTE - ASSESSMENT
pt tolerated exam well  deferring pain control at this time    - pitocin 2u at this time, continue    Amyeo Afroz Jereen, PGY-3  d/w Dr Jennings

## 2024-02-02 NOTE — OB PROVIDER LABOR PROGRESS NOTE - ASSESSMENT
cervical balloon replaced with 80 in uterine balloon  Continue pitocin  Pt repositioned in left lateral   Pt declines epidural     jose antonio GALARZA

## 2024-02-02 NOTE — OB RN PATIENT PROFILE - NSICDXPASTMEDICALHX_GEN_ALL_CORE_FT
practice positioning for a deeper and more active feeding/normal latch/shallow latch PAST MEDICAL HISTORY:  No pertinent past medical history

## 2024-02-02 NOTE — OB RN PATIENT PROFILE - AS SC BRADEN NUTRITION

## 2024-02-02 NOTE — OB PROVIDER H&P - NSLOWPPHRISK_OBGYN_A_OB
No previous uterine incision/Barrera Pregnancy/Less than or equal to 4 previous vaginal births/No known bleeding disorder/No history of postpartum hemorrhage/No other PPH risks indicated

## 2024-02-02 NOTE — OB PROVIDER H&P - NSHPPHYSICALEXAM_GEN_ALL_CORE
Vital Signs Last 24 Hrs  T(C): 36.8 (02 Feb 2024 19:17), Max: 36.8 (02 Feb 2024 18:24)  T(F): 98.24 (02 Feb 2024 19:17), Max: 98.24 (02 Feb 2024 19:17)  HR: 88 (02 Feb 2024 19:18) (88 - 100)  BP: 111/70 (02 Feb 2024 19:18) (110/61 - 111/70)  RR: 16 (02 Feb 2024 18:24) (16 - 16)  Parameters below as of 02 Feb 2024 18:24  Patient On (Oxygen Delivery Method): room air    Assessment reveals VSS  General: Female sitting comfortably in no apparent distress  Neuro: No facial asymmetry, no slurred speech, moves all 4 extremities  Mood: Alert and lucid, appropriate mood and affect  A&Ox3  Lungs- clear bilateral  Heart- normal rate and regular rhythm  Extremities- Warm, Dry, no edema present, good pulses   Abdomen soft, NT, gravid  Cat 1 tracing reactive, ctx every 3 mins  Transabdominal Ultrasound- images saved ASOB, vtx, post placenta, stefan:2.5  Sterile Speculum- thick clear d/c, negative nitrazine, negative ferning   Vaginal Exam- 1.5/50/-3, performed by Dr. Jennings         PLAN: Admit for Oligo

## 2024-02-02 NOTE — OB PROVIDER H&P - HISTORY OF PRESENT ILLNESS
29y/o  @38wks presents from Dr. Jennings office with anhydramnios and leaking of clear discharge x1 week.  Reports good fetal movement  Denies VB    Allergies: Amoxicillin- left sided heaviness   Medications: PNV, Iron Transfusion x3 (last one ), Vit D

## 2024-02-02 NOTE — OB PROVIDER H&P - PRO INTERPRETER NEED 2
Initial SW/CM Assessment/Plan of Care Note     Baseline Assessment  61 year old admitted 8/1/2023 as Observation with a diagnosis of vomiting and diarrhea.   Prior to admission patient was living with Spouse and residing at House .Patient does not  have a Power of  for Healthcare.  Document is not activated.Patient’s Primary Care Provider is Cruz Kaiser DO.     Progress Note  CM reviewed chart, met with pt, introduced role, verified, updated and faxed facesheet to [id number]. From home w/spouse. IADL. Has BP monitor. Hx stage III invasive bladder ca, s/p TURBT. CM discussed home health, provided choice. Declined resoure. Anticipates no needs. Spouse to drive pt home on dc.    Plan  Patient/Family Discharge Goal: Home   Is patient/family goal achievable: Yes   SW/CM - Recommendations for Discharge: Home   PT - Recommendations for Discharge:   Last Filed Values     None        OT - Recommendations for Discharge:   Last Filed Values     None        SLP - Recommendations for Discharge:   Last Filed Values     None          Barriers to Discharge  Identified Barriers to Discharge/Transition Planning: Assessment stabilization  Anticipate patient will not need post-hospital services.  Anticipate patient can return to the environment from which patient entered the hospital.   Anticipate patient can provide self-care at discharge.    Refer to SW/CM Flowsheet for objective data.     Medical History  Past Medical History:   Diagnosis Date   • Bladder cancer (CMD)    • Essential (primary) hypertension    • Hx of colonoscopy 2013   • Hyperlipemia        Prior to Admission Status  Functional Status  Ambulation: Independent/Self  Bathing: Independent/Self  Dressing: Independent/Self  Toileting: Independent/Self  Transportation: Independent/Self    Agency/Support  Type of Services Prior to Hospitalization: None   Support Systems: Spouse   Home Devices/Equipment: Blood pressure monitor    Mobility Assist Devices:  None  Sensory Support Devices: Contacts (Right contact)      Current Status  Current Mental Status: Alert, Oriented to Person, Oriented to Place, Oriented to Reason for Hospitalization, Oriented to Time    Insurance  Primary: [facility]  Secondary: N/A    Disposition Recommendations:  NANETTE/RUEL recommendation for discharge: Home          English

## 2024-02-03 ENCOUNTER — TRANSCRIPTION ENCOUNTER (OUTPATIENT)
Age: 29
End: 2024-02-03

## 2024-02-03 LAB — T PALLIDUM AB TITR SER: NEGATIVE — SIGNIFICANT CHANGE UP

## 2024-02-03 PROCEDURE — 59400 OBSTETRICAL CARE: CPT | Mod: U9,GC

## 2024-02-03 RX ORDER — OXYCODONE HYDROCHLORIDE 5 MG/1
5 TABLET ORAL ONCE
Refills: 0 | Status: DISCONTINUED | OUTPATIENT
Start: 2024-02-03 | End: 2024-02-04

## 2024-02-03 RX ORDER — SODIUM CHLORIDE 9 MG/ML
3 INJECTION INTRAMUSCULAR; INTRAVENOUS; SUBCUTANEOUS EVERY 8 HOURS
Refills: 0 | Status: DISCONTINUED | OUTPATIENT
Start: 2024-02-03 | End: 2024-02-04

## 2024-02-03 RX ORDER — BENZOCAINE 10 %
1 GEL (GRAM) MUCOUS MEMBRANE EVERY 6 HOURS
Refills: 0 | Status: DISCONTINUED | OUTPATIENT
Start: 2024-02-03 | End: 2024-02-04

## 2024-02-03 RX ORDER — FERROUS SULFATE 325(65) MG
1 TABLET ORAL
Refills: 0 | DISCHARGE

## 2024-02-03 RX ORDER — DIPHENHYDRAMINE HCL 50 MG
25 CAPSULE ORAL EVERY 6 HOURS
Refills: 0 | Status: DISCONTINUED | OUTPATIENT
Start: 2024-02-03 | End: 2024-02-04

## 2024-02-03 RX ORDER — OXYTOCIN 10 UNIT/ML
10 VIAL (ML) INJECTION ONCE
Refills: 0 | Status: COMPLETED | OUTPATIENT
Start: 2024-02-03 | End: 2024-02-03

## 2024-02-03 RX ORDER — IBUPROFEN 200 MG
1 TABLET ORAL
Qty: 0 | Refills: 0 | DISCHARGE
Start: 2024-02-03

## 2024-02-03 RX ORDER — KETOROLAC TROMETHAMINE 30 MG/ML
30 SYRINGE (ML) INJECTION ONCE
Refills: 0 | Status: DISCONTINUED | OUTPATIENT
Start: 2024-02-03 | End: 2024-02-03

## 2024-02-03 RX ORDER — LANOLIN
1 OINTMENT (GRAM) TOPICAL EVERY 6 HOURS
Refills: 0 | Status: DISCONTINUED | OUTPATIENT
Start: 2024-02-03 | End: 2024-02-04

## 2024-02-03 RX ORDER — HYDROCORTISONE 1 %
1 OINTMENT (GRAM) TOPICAL EVERY 6 HOURS
Refills: 0 | Status: DISCONTINUED | OUTPATIENT
Start: 2024-02-03 | End: 2024-02-04

## 2024-02-03 RX ORDER — OXYTOCIN 10 UNIT/ML
41.67 VIAL (ML) INJECTION
Qty: 20 | Refills: 0 | Status: DISCONTINUED | OUTPATIENT
Start: 2024-02-03 | End: 2024-02-04

## 2024-02-03 RX ORDER — MAGNESIUM HYDROXIDE 400 MG/1
30 TABLET, CHEWABLE ORAL
Refills: 0 | Status: DISCONTINUED | OUTPATIENT
Start: 2024-02-03 | End: 2024-02-04

## 2024-02-03 RX ORDER — AER TRAVELER 0.5 G/1
1 SOLUTION RECTAL; TOPICAL EVERY 4 HOURS
Refills: 0 | Status: DISCONTINUED | OUTPATIENT
Start: 2024-02-03 | End: 2024-02-04

## 2024-02-03 RX ORDER — TETANUS TOXOID, REDUCED DIPHTHERIA TOXOID AND ACELLULAR PERTUSSIS VACCINE, ADSORBED 5; 2.5; 8; 8; 2.5 [IU]/.5ML; [IU]/.5ML; UG/.5ML; UG/.5ML; UG/.5ML
0.5 SUSPENSION INTRAMUSCULAR ONCE
Refills: 0 | Status: DISCONTINUED | OUTPATIENT
Start: 2024-02-03 | End: 2024-02-04

## 2024-02-03 RX ORDER — OXYCODONE HYDROCHLORIDE 5 MG/1
5 TABLET ORAL
Refills: 0 | Status: DISCONTINUED | OUTPATIENT
Start: 2024-02-03 | End: 2024-02-04

## 2024-02-03 RX ORDER — ACETAMINOPHEN 500 MG
975 TABLET ORAL
Refills: 0 | Status: DISCONTINUED | OUTPATIENT
Start: 2024-02-03 | End: 2024-02-04

## 2024-02-03 RX ORDER — SIMETHICONE 80 MG/1
80 TABLET, CHEWABLE ORAL EVERY 4 HOURS
Refills: 0 | Status: DISCONTINUED | OUTPATIENT
Start: 2024-02-03 | End: 2024-02-04

## 2024-02-03 RX ORDER — ACETAMINOPHEN 500 MG
3 TABLET ORAL
Qty: 0 | Refills: 0 | DISCHARGE
Start: 2024-02-03

## 2024-02-03 RX ORDER — IBUPROFEN 200 MG
600 TABLET ORAL EVERY 6 HOURS
Refills: 0 | Status: COMPLETED | OUTPATIENT
Start: 2024-02-03 | End: 2025-01-01

## 2024-02-03 RX ORDER — IBUPROFEN 200 MG
600 TABLET ORAL EVERY 6 HOURS
Refills: 0 | Status: DISCONTINUED | OUTPATIENT
Start: 2024-02-03 | End: 2024-02-04

## 2024-02-03 RX ORDER — PRAMOXINE HYDROCHLORIDE 150 MG/15G
1 AEROSOL, FOAM RECTAL EVERY 4 HOURS
Refills: 0 | Status: DISCONTINUED | OUTPATIENT
Start: 2024-02-03 | End: 2024-02-04

## 2024-02-03 RX ORDER — DIBUCAINE 1 %
1 OINTMENT (GRAM) RECTAL EVERY 6 HOURS
Refills: 0 | Status: DISCONTINUED | OUTPATIENT
Start: 2024-02-03 | End: 2024-02-04

## 2024-02-03 RX ADMIN — Medication 600 MILLIGRAM(S): at 16:03

## 2024-02-03 RX ADMIN — Medication 975 MILLIGRAM(S): at 20:47

## 2024-02-03 RX ADMIN — Medication 10 UNIT(S): at 05:58

## 2024-02-03 RX ADMIN — Medication 1 TABLET(S): at 16:03

## 2024-02-03 RX ADMIN — SODIUM CHLORIDE 3 MILLILITER(S): 9 INJECTION INTRAMUSCULAR; INTRAVENOUS; SUBCUTANEOUS at 20:43

## 2024-02-03 RX ADMIN — Medication 600 MILLIGRAM(S): at 17:03

## 2024-02-03 RX ADMIN — Medication 125 MILLIUNIT(S)/MIN: at 06:15

## 2024-02-03 RX ADMIN — Medication 975 MILLIGRAM(S): at 21:20

## 2024-02-03 RX ADMIN — Medication 30 MILLIGRAM(S): at 06:50

## 2024-02-03 RX ADMIN — SODIUM CHLORIDE 3 MILLILITER(S): 9 INJECTION INTRAMUSCULAR; INTRAVENOUS; SUBCUTANEOUS at 14:00

## 2024-02-03 RX ADMIN — Medication 30 MILLIGRAM(S): at 06:20

## 2024-02-03 NOTE — DISCHARGE NOTE OB - HOSPITAL COURSE
Patient was admitted for induction of labor due to oligohydramnios and underwent vaginal delivery with 1st degree laceration   Postpartum course was uncomplicated.  On day of discharge patient was tolerating regular diet, ambulating, voiding freely, and had satisfactory pain control

## 2024-02-03 NOTE — DISCHARGE NOTE OB - PATIENT PORTAL LINK FT
You can access the FollowMyHealth Patient Portal offered by [facility] by registering at the following website: [email or web address]. By joining [employer]’s FollowMyHealth portal, you will also be able to view your health information using other applications (apps) compatible with our system.

## 2024-02-03 NOTE — OB PROVIDER LABOR PROGRESS NOTE - ASSESSMENT
Peanut ball placed  Continue pitocin  Pt continues to decline pain medication     jose antonio GALARZA

## 2024-02-03 NOTE — OB PROVIDER LABOR PROGRESS NOTE - NS_OBIHIFHRDETAILS_OBGYN_ALL_OB_FT
135/mod/+accels/-decels
135/mod/+accels/intermittent variables
Cat1
cat 1
135/mod/+accels/decel with VE
cat 1
cat 1

## 2024-02-03 NOTE — DISCHARGE NOTE OB - CARE PROVIDER_API CALL
Dakota Jennings  Obstetrics and Gynecology  [address]  [place] [address]  Phone: [phone]  Fax: [phone]  Follow Up Time:

## 2024-02-03 NOTE — OB PROVIDER LABOR PROGRESS NOTE - ASSESSMENT
- AROM with clear fluid  - cont pitocin for iol  - IVF  - Pt declines epidural at this time  - Analgesia prn    D/W Dr. Dick Samuel PGY4

## 2024-02-03 NOTE — OB RN DELIVERY SUMMARY - NS_SEPSISRSKCALC_OBGYN_ALL_OB_FT
EOS calculated successfully. EOS Risk Factor: 0.5/1000 live births ([facility] national incidence); GA=38w1d; Temp=98.24; ROM=4.533; GBS='Negative'; Antibiotics='No antibiotics or any antibiotics < 2 hrs prior to birth'

## 2024-02-03 NOTE — OB PROVIDER LABOR PROGRESS NOTE - NSVAGINALEXAM_OBGYN_ALL_OB_DT
02-Feb-2024 21:52
03-Feb-2024 00:25
02-Feb-2024 23:03
03-Feb-2024 01:26
03-Feb-2024 03:27
02-Feb-2024 21:22
03-Feb-2024 04:00

## 2024-02-03 NOTE — OB RN DELIVERY SUMMARY - NS_PROPHYLABX_OBGYN_ALL_OB
N/A Double O-Z Flap Text: The defect edges were debeveled with a #15 scalpel blade.  Given the location of the defect, shape of the defect and the proximity to free margins a Double O-Z flap was deemed most appropriate.  Using a sterile surgical marker, an appropriate transposition flap was drawn incorporating the defect and placing the expected incisions within the relaxed skin tension lines where possible. The area thus outlined was incised deep to adipose tissue with a #15 scalpel blade.  The skin margins were undermined to an appropriate distance in all directions utilizing iris scissors.

## 2024-02-03 NOTE — DISCHARGE NOTE OB - CARE PLAN
1 Principal Discharge DX:	Spontaneous vaginal delivery  Assessment and plan of treatment:	After discharge, please stay on pelvic rest for 6 weeks, meaning no sexual intercourse, no tampons and no douching. Expect to have vaginal bleeding/spotting for up to six weeks.  The bleeding should get lighter and more white/light brown with time.  For bleeding soaking more than a pad an hour or passing clots greater than the size of your fist, come in to the emergency department.  Follow up in the office in 6 weeks

## 2024-02-03 NOTE — OB PROVIDER LABOR PROGRESS NOTE - NS_SUBJECTIVE/OBJECTIVE_OBGYN_ALL_OB_FT
Patient seen and examined at bedside.
Pt seen and examined at bedside.
Pt seen and examined due to increased rectal pressure
Pt seen and examined due to increased rectal pressure
Pt seen and examined due to increased pressure  CB recently out
Pt seen and examined at bedside.
CB out per RN

## 2024-02-03 NOTE — OB PROVIDER LABOR PROGRESS NOTE - ASSESSMENT
Positioned on hands and knees for pt comfort and to encourage descent of vertex  Return to baseline  Pt declines epidural     jose antonio GALARZA Positioned on hands and knees for pt comfort and to encourage descent of vertex  Pitocin decreased to 6  Return to baseline  Pt declines epidural     jose antonio GALARZA

## 2024-02-03 NOTE — OB PROVIDER DELIVERY SUMMARY - NSPROVIDERDELIVERYNOTE_OBGYN_ALL_OB_FT
Spontaneous vaginal delivery of liveborn infant from OA position. Head, shoulders, and body delivered easily. Infant was suctioned. No mec. 1 minute delayed cord clamping was performed. Cord clamped and cut and infant passed to mother. Placenta delivered intact with a 3 vessel cord. Fundal massage was given and uterine fundus was found to be firm. Vaginal exam revealed an intact cervix, vaginal walls, and sulci. Patient had a 1st degree laceration in the perineum that was repaired with 3.0 vicryl suture. Excellent hemostasis was noted. IM pitocin given for intermittent atony with good response. Patient was stable and went to recovery. Count was correct x2.

## 2024-02-03 NOTE — OB RN DELIVERY SUMMARY - NSSELHIDDEN_OBGYN_ALL_OB_FT
[NS_DeliveryAttending1_OBGYN_ALL_OB_FT:[id number]],[NS_DeliveryRN_OBGYN_ALL_OB_FT:[id number]=]

## 2024-02-03 NOTE — OB PROVIDER LABOR PROGRESS NOTE - ASSESSMENT
- cervical balloon out per nursing  - SVE 4/8/0/-3  - cont iol with pitocin  - IVF  - analgesia prn    D/w Dr. Dick Samuel PGY4

## 2024-02-04 VITALS
TEMPERATURE: 98 F | DIASTOLIC BLOOD PRESSURE: 51 MMHG | SYSTOLIC BLOOD PRESSURE: 99 MMHG | HEART RATE: 84 BPM | OXYGEN SATURATION: 100 % | RESPIRATION RATE: 17 BRPM

## 2024-02-04 RX ADMIN — Medication 600 MILLIGRAM(S): at 00:02

## 2024-02-04 RX ADMIN — SODIUM CHLORIDE 3 MILLILITER(S): 9 INJECTION INTRAMUSCULAR; INTRAVENOUS; SUBCUTANEOUS at 05:44

## 2024-02-04 RX ADMIN — Medication 600 MILLIGRAM(S): at 00:30

## 2024-02-04 NOTE — PROGRESS NOTE ADULT - SUBJECTIVE AND OBJECTIVE BOX
S: Patient doing well. Minimal lochia. Pain controlled. breastfeeding    O: Vital Signs Last 24 Hrs  T(C): 36.6 (2024 06:00), Max: 37.1 (2024 13:49)  T(F): 97.8 (2024 06:00), Max: 98.7 (2024 13:49)  HR: 80 (2024 06:00) (80 - 94)  BP: 105/58 (2024 21:45) (93/53 - 105/58)  BP(mean): --  RR: 18 (2024 21:45) (17 - 18)  SpO2: 100% (2024 21:45) (99% - 100%)    Parameters below as of 2024 21:45  Patient On (Oxygen Delivery Method): room air        Gen: NAD  Abd: soft, NT, ND, fundus firm below umbilicus  Lochia: moderate  Ext: no tenderness    Labs:                        10.9   8.53  )-----------( 255      ( 2024 21:01 )             35.4       A: 28y PPD# 1s/p  doing well.     Plan: Continue routine postpartum care. Anticipate d/c home this pm

## 2024-02-09 ENCOUNTER — APPOINTMENT (OUTPATIENT)
Dept: OBGYN | Facility: CLINIC | Age: 29
End: 2024-02-09

## 2024-02-13 ENCOUNTER — APPOINTMENT (OUTPATIENT)
Dept: OBGYN | Facility: CLINIC | Age: 29
End: 2024-02-13

## 2024-03-13 ENCOUNTER — NON-APPOINTMENT (OUTPATIENT)
Age: 29
End: 2024-03-13

## 2024-03-29 ENCOUNTER — APPOINTMENT (OUTPATIENT)
Dept: OBGYN | Facility: CLINIC | Age: 29
End: 2024-03-29
Payer: COMMERCIAL

## 2024-03-29 VITALS
HEIGHT: 62 IN | HEART RATE: 97 BPM | BODY MASS INDEX: 25.4 KG/M2 | DIASTOLIC BLOOD PRESSURE: 72 MMHG | SYSTOLIC BLOOD PRESSURE: 105 MMHG | WEIGHT: 138 LBS

## 2024-03-29 DIAGNOSIS — Z30.9 ENCOUNTER FOR CONTRACEPTIVE MANAGEMENT, UNSPECIFIED: ICD-10-CM

## 2024-03-29 PROCEDURE — 0503F POSTPARTUM CARE VISIT: CPT

## 2024-03-29 RX ORDER — NORETHINDRONE ACETATE AND ETHINYL ESTRADIOL AND FERROUS FUMARATE 1MG-20(21)
1-20 KIT ORAL
Qty: 3 | Refills: 3 | Status: ACTIVE | COMMUNITY
Start: 2024-03-29 | End: 1900-01-01

## 2024-03-29 NOTE — HISTORY OF PRESENT ILLNESS
[Postpartum Follow Up] : postpartum follow up [Complications:___] : no complications [Vaginal Delivery] : vaginal delivery [Delivery Date Was ___] : delivery date was [unfilled] [Boy] : baby is a boy [Infant's Name ___] : [unfilled] [___ Lbs] : [unfilled] lbs [___ Oz] : [unfilled] oz [None] : The patient did not have any  complications [Living at Home] : is currently living at home [Circumcised] : circumcised [Breastfeeding] : breastfeeding [Bottle Feeding] : bottle feeding

## 2024-06-15 ENCOUNTER — EMERGENCY (EMERGENCY)
Facility: HOSPITAL | Age: 29
LOS: 1 days | Discharge: HOME | End: 2024-06-15
Attending: STUDENT IN AN ORGANIZED HEALTH CARE EDUCATION/TRAINING PROGRAM
Payer: COMMERCIAL

## 2024-06-15 VITALS
HEART RATE: 76 BPM | HEIGHT: 62 IN | WEIGHT: 130.07 LBS | SYSTOLIC BLOOD PRESSURE: 110 MMHG | RESPIRATION RATE: 18 BRPM | TEMPERATURE: 98 F | OXYGEN SATURATION: 100 % | DIASTOLIC BLOOD PRESSURE: 78 MMHG

## 2024-06-15 LAB
ALBUMIN SERPL ELPH-MCNC: 4.3 G/DL — SIGNIFICANT CHANGE UP (ref 3.3–5)
ALP SERPL-CCNC: 45 U/L — SIGNIFICANT CHANGE UP (ref 40–120)
ALT FLD-CCNC: 16 U/L — SIGNIFICANT CHANGE UP (ref 10–45)
ANION GAP SERPL CALC-SCNC: 13 MMOL/L — SIGNIFICANT CHANGE UP (ref 5–17)
APTT BLD: 35.5 SEC — SIGNIFICANT CHANGE UP (ref 24.5–35.6)
AST SERPL-CCNC: 18 U/L — SIGNIFICANT CHANGE UP (ref 10–40)
BASOPHILS # BLD AUTO: 0.05 K/UL — SIGNIFICANT CHANGE UP (ref 0–0.2)
BASOPHILS NFR BLD AUTO: 0.7 % — SIGNIFICANT CHANGE UP (ref 0–2)
BILIRUB SERPL-MCNC: 0.1 MG/DL — LOW (ref 0.2–1.2)
BUN SERPL-MCNC: 13 MG/DL — SIGNIFICANT CHANGE UP (ref 7–23)
CALCIUM SERPL-MCNC: 9 MG/DL — SIGNIFICANT CHANGE UP (ref 8.4–10.5)
CHLORIDE SERPL-SCNC: 103 MMOL/L — SIGNIFICANT CHANGE UP (ref 96–108)
CO2 SERPL-SCNC: 23 MMOL/L — SIGNIFICANT CHANGE UP (ref 22–31)
CREAT SERPL-MCNC: 0.86 MG/DL — SIGNIFICANT CHANGE UP (ref 0.5–1.3)
EGFR: 93 ML/MIN/1.73M2 — SIGNIFICANT CHANGE UP
EOSINOPHIL # BLD AUTO: 0.22 K/UL — SIGNIFICANT CHANGE UP (ref 0–0.5)
EOSINOPHIL NFR BLD AUTO: 3.2 % — SIGNIFICANT CHANGE UP (ref 0–6)
GLUCOSE SERPL-MCNC: 93 MG/DL — SIGNIFICANT CHANGE UP (ref 70–99)
HCG SERPL-ACNC: <2 MIU/ML — SIGNIFICANT CHANGE UP
HCT VFR BLD CALC: 36.7 % — SIGNIFICANT CHANGE UP (ref 34.5–45)
HGB BLD-MCNC: 11.9 G/DL — SIGNIFICANT CHANGE UP (ref 11.5–15.5)
IMM GRANULOCYTES NFR BLD AUTO: 0.1 % — SIGNIFICANT CHANGE UP (ref 0–0.9)
INR BLD: 0.96 RATIO — SIGNIFICANT CHANGE UP (ref 0.85–1.18)
LYMPHOCYTES # BLD AUTO: 3.46 K/UL — HIGH (ref 1–3.3)
LYMPHOCYTES # BLD AUTO: 50 % — HIGH (ref 13–44)
MAGNESIUM SERPL-MCNC: 2.2 MG/DL — SIGNIFICANT CHANGE UP (ref 1.6–2.6)
MCHC RBC-ENTMCNC: 24.9 PG — LOW (ref 27–34)
MCHC RBC-ENTMCNC: 32.4 GM/DL — SIGNIFICANT CHANGE UP (ref 32–36)
MCV RBC AUTO: 76.9 FL — LOW (ref 80–100)
MONOCYTES # BLD AUTO: 0.33 K/UL — SIGNIFICANT CHANGE UP (ref 0–0.9)
MONOCYTES NFR BLD AUTO: 4.8 % — SIGNIFICANT CHANGE UP (ref 2–14)
NEUTROPHILS # BLD AUTO: 2.85 K/UL — SIGNIFICANT CHANGE UP (ref 1.8–7.4)
NEUTROPHILS NFR BLD AUTO: 41.2 % — LOW (ref 43–77)
NRBC # BLD: 0 /100 WBCS — SIGNIFICANT CHANGE UP (ref 0–0)
NT-PROBNP SERPL-SCNC: <36 PG/ML — SIGNIFICANT CHANGE UP (ref 0–300)
PLATELET # BLD AUTO: 281 K/UL — SIGNIFICANT CHANGE UP (ref 150–400)
POTASSIUM SERPL-MCNC: 4.1 MMOL/L — SIGNIFICANT CHANGE UP (ref 3.5–5.3)
POTASSIUM SERPL-SCNC: 4.1 MMOL/L — SIGNIFICANT CHANGE UP (ref 3.5–5.3)
PROT SERPL-MCNC: 7.5 G/DL — SIGNIFICANT CHANGE UP (ref 6–8.3)
PROTHROM AB SERPL-ACNC: 10.6 SEC — SIGNIFICANT CHANGE UP (ref 9.5–13)
RBC # BLD: 4.77 M/UL — SIGNIFICANT CHANGE UP (ref 3.8–5.2)
RBC # FLD: 14.1 % — SIGNIFICANT CHANGE UP (ref 10.3–14.5)
SODIUM SERPL-SCNC: 139 MMOL/L — SIGNIFICANT CHANGE UP (ref 135–145)
TROPONIN T, HIGH SENSITIVITY RESULT: <6 NG/L — SIGNIFICANT CHANGE UP (ref 0–51)
WBC # BLD: 6.92 K/UL — SIGNIFICANT CHANGE UP (ref 3.8–10.5)
WBC # FLD AUTO: 6.92 K/UL — SIGNIFICANT CHANGE UP (ref 3.8–10.5)

## 2024-06-15 RX ORDER — ACETAMINOPHEN 500 MG
975 TABLET ORAL ONCE
Refills: 0 | Status: COMPLETED | OUTPATIENT
Start: 2024-06-15 | End: 2024-06-15

## 2024-06-15 RX ADMIN — Medication 975 MILLIGRAM(S): at 22:18

## 2024-06-15 NOTE — ED PROVIDER NOTE - ATTENDING CONTRIBUTION TO CARE
Alexander Saab DO: I have personally performed a face to face medical and diagnostic evaluation of the patient. I have discussed with and reviewed the Resident's and/or ACP's and/or Medical/PA/NP student's note and agree with the History, ROS, Physical Exam and MDM unless otherwise indicated. A brief summary of my personal evaluation and impression can be found below.      29F w/ no pmh presenting with cp. Pt has had 2w intermittent L sided cp described as tingling with pressure associated with L arm tingling. +ocp, recent travel to [facility]. Had one episode of L calf cramping earlier today. No hx blood clots, leg swelling. Pt went to pcp, had ekg which was normal. Pt recently gave birth 4mo ago, holds her baby in L arm only. No fever, sob, abd pain, n/v.    CONSTITUTIONAL: well-appearing, in NAD  SKIN: Warm dry, normal skin turgor  HEAD: NCAT  ENT: normal pharynx with no erythema or exudates  NECK: Supple; non tender. Full ROM.  CARD: RRR, no murmurs.  RESP: clear to ausculation b/l. No crackles or wheezing.  ABD: soft, non-tender, non-distended, no rebound or guarding.  MSK: Full ROM, ttp over L latissimus dorsi, L trapezius, L bicep w/ + Yergason, L superior chest wall ttp.  NEURO: normal motor. normal sensory. CN II-XII intact. Cerebellar testing normal. Normal gait.  PSYCH: Cooperative, appropriate.     suspect CP 2/2 msk pain from holding child, no strong fam hx of cardiac disease, acs/cardiac cause lower, less likely infectious, low suspicion DVT/PE, will obtain dmer. trop, labs, ekg, if unremarkable will dc home. infectious not likely w/ no symptoms and normal vs

## 2024-06-15 NOTE — ED ADULT TRIAGE NOTE - HEIGHT IN FEET
Indication: Left L4-S1 RFA.



Intraoperative fluoroscopy provided for 13 seconds.  3 digital spot image

submitted for interpretation demonstrates posterior needle tips projecting

over expected left L4-S1 nerve roots.  Correlate with intraoperative

findings/report. 5

## 2024-06-15 NOTE — ED PROVIDER NOTE - NSFOLLOWUPCLINICS_GEN_ALL_ED_FT
Cardiology at [facility]  Cardiology  [address] [place] [id number]  Phone: [phone]  Fax:     Cardiology at [facility]  Cardiology  [address] [place] [address]  Phone: [phone]  Fax:

## 2024-06-15 NOTE — ED PROVIDER NOTE - ED STEMI HIDDEN
· Appreciate Gynecologic Oncology evaluation on recent admission   · She received Taxol/carboplatin and received last chemotherapy 09/09/2019  · Received granix 08/31/2019, also Neulasta 09/09/2019 and 09/16/2019  · Chemotherapy to be on hold for now given acute infection  · CT CAP - shows some improvement in areas of malignancy, R>L pleural effusion, nodules in lung which can be infectious/inflammatory    hide

## 2024-06-15 NOTE — ED PROVIDER NOTE - PHYSICAL EXAMINATION
General appearance: NAD, conversant, afebrile    Eyes: anicteric sclerae, DAVID, EOMI   HENT: Atraumatic; oropharynx clear, MMM and no ulcerations, no pharyngeal erythema or exudate   Neck: Trachea midline; Full range of motion, supple   Pulm: CTA bl, normal respiratory effort and no intercostal retractions, normal work of breathing   CV: reproducible L sternal chest pain, RRR, No murmurs, rubs, or gallops.    Abdomen: Soft, non-tender, non-distended; no guarding or rebound   Extremities: No peripheral edema or extremity lymphadenopathy. No BLE ttp.   Skin: Dry, normal temperature, turgor and texture; no rash, ulcers or subcutaneous nodules   Psych: Appropriate affect, cooperative

## 2024-06-15 NOTE — ED PROVIDER NOTE - NSFOLLOWUPINSTRUCTIONS_ED_ALL_ED_FT
You were seen in the ER for chest pain. Your lab results were within normal limits. You can take tylenol and/or motrin for your pain. Please follow the instructions on the packaging.    Please follow up with your cardiologist.  Please follow up with your primary care doctor.    Please return to the ER if you have worsening symptoms including fever, chest pain, shortness of breath, abdominal pain, nausea, vomiting, diarrhea, weakness or lightheadedness/fainting.

## 2024-06-15 NOTE — ED ADULT NURSE NOTE - OBJECTIVE STATEMENT
Pt presents to the ED A&Ox4 complaining of L sided chest pain, nonradiating and shortness of breath x 2 weeks. Pt describes complaint as worsening and intermittent in nature. Pt presents to the ED A&Ox4 complaining of L sided chest pain r/t L arm, and shortness of breath x 2 weeks. Pt describes complaint as worsening and intermittent in nature. Pt states she recently gave birth 4 months ago. Denies hx of blood clots, leg swelling, headaches, nausea, fevers. Pt placed on cardiac monitor at bedside.

## 2024-06-15 NOTE — ED ADULT NURSE NOTE - SUICIDE SCREENING QUESTION 3
No Patient lives with son and grandchildren in a private house, 2 steps to enter. Patient has home health aide 11 hours/day, 7 days/week. Patient has a single axis cane and rollator, however prefers to use the cane.    Patient was left sitting at edge of bed, daughter at bedside, all lines/tubes intact and call bell within reach, RN aware

## 2024-06-15 NOTE — ED PROVIDER NOTE - PATIENT PORTAL LINK FT
You can access the FollowMyHealth Patient Portal offered by [facility] by registering at the following website: [email or web address]. By joining [employer]’s FollowMyHealth portal, you will also be able to view your health information using other applications (apps) compatible with our system.

## 2024-06-15 NOTE — ED PROVIDER NOTE - CLINICAL SUMMARY MEDICAL DECISION MAKING FREE TEXT BOX
29F w/ no pmh presenting with cp. Pt has had 2w intermittent L sided cp described as tingling with pressure associated with L arm tingling. +ocp, recent travel to [facility]. Had one episode of L calf cramping earlier today. No hx blood clots, leg swelling. Pt went to pcp, had ekg which was normal. Pt recently gave birth 4mo ago, holds her baby in L arm only. No fever, sob, abd pain, n/v. Exam shows L sternal reproducible chest pain. No leg swelling or ttp. Well appearing. Pt currently cp free. Low suspicion ACS given pt young, has no risk factors. Given ocp use, recent travel, will get dimer. Will get labs, trop, ekg, reassess.

## 2024-06-15 NOTE — ED PROVIDER NOTE - OBJECTIVE STATEMENT
29F w/ no pmh presenting with cp. Pt has had 2w intermittent L sided cp described as tingling with pressure associated with L arm tingling. +ocp, recent travel to [facility]. Had one episode of L calf cramping earlier today. No hx blood clots, leg swelling. Pt went to pcp, had ekg which was normal. Pt recently gave birth 4mo ago, holds her baby in L arm only. No fever, sob, abd pain, n/v.

## 2024-06-15 NOTE — ED ADULT TRIAGE NOTE - BP NONINVASIVE DIASTOLIC (MM HG)
Call our office if you notice weight gain of 5lbs in 1 week or 3lbs in 1 day that is water and needs to be addressed.     Continue to limit your salt intake to <2G/day in your diet. Limit your potassium high foods in your diet.     Use knee high bilateral compression socks to help with swelling in your legs. You can purchase these off of [employer]   Patient Education     Discharge Instructions: Eating a Low-Potassium Diet  Your healthcare provider has prescribed a low-potassium diet for you. This kind of diet is advised for people who have certain kidney problems. Potassium is needed for muscle function. But too much potassium is a health risk. Potassium is found in many foods. Read below to find out how to change your diet.   Foods to limit  Some foods are high in potassium. Limit your daily intake of the foods in the list below.   · Fruits: apricots (canned and fresh), bananas, cantaloupe, honeydew melon, kiwi, nectarines, pomegranates, oranges, orange juice, pears, dried fruits (apricots, dates, figs, prunes), and prune juice  · Vegetables: asparagus, avocado, artichoke, bamboo shoots, beets, brussels sprouts, cabbage, celery, chard, okra, potatoes (white and sweet), pumpkin, rutabaga, spinach (cooked), squash, tomato, tomato sauce, tomato juice, and vegetable juice cocktail  · Legumes: black-eyed peas, chickpeas, lentils, lima beans, navy beans, red kidney beans, soybeans, and split peas  · Nuts and seeds: almonds, Brazil nuts, cashews, peanuts, peanut butter, pecans, pumpkin seeds, sunflower seeds, and walnuts  · Breads and cereals: bran and whole-grain products  · Dairy foods: milk, cheese, ice cream, yogurt  · Animal protein: all forms of animal protein  · Other: chocolate, cocoa, coconut milk, and molasses  Tips  · Ask your healthcare provider how much potassium you are allowed each day. This will help you figure out serving sizes for your needs.  · Check labels for potassium. It may be listed as potassium  chloride.  · Don't use salt substitutes. These often have potassium in them.  · Cook frozen fruits and vegetables in water. Rinse and drain them well before eating.  · Drain liquid from all canned fruits and vegetables. Rinse them before eating.  · Reduce the potassium in potatoes. Peel them, slice thinly, and soak in water for at least 4 hours.  · Reduce the potassium in green leafy vegetables. Soak them in water for at least 4 hours.  · Eat white rice and refined white flour products. These include white bread, pasta, and grits.    Follow-up  Make a follow-up appointment as advised by your healthcare provider.  When to call your healthcare provider  Call your healthcare provider right away if you have any of the following:  · Tiredness (fatigue)  · Shortness of breath  · Chest pain  · Slow, irregular heartbeat  · Fainting  · Dizziness  · Lightheadedness  · Confusion  · Feeling like your heart is pounding (palpitations)  [employer] last reviewed this educational content on 5/1/2020  © 2924-5859 [employer]. [address] [place] [address], [place] [address]. All rights reserved. This information is not intended as a substitute for professional medical care. Always follow your healthcare professional's instructions.            78

## 2024-06-16 VITALS
OXYGEN SATURATION: 98 % | SYSTOLIC BLOOD PRESSURE: 101 MMHG | DIASTOLIC BLOOD PRESSURE: 63 MMHG | TEMPERATURE: 98 F | HEART RATE: 64 BPM | RESPIRATION RATE: 19 BRPM

## 2024-07-22 ENCOUNTER — APPOINTMENT (OUTPATIENT)
Dept: CT IMAGING | Facility: CLINIC | Age: 29
End: 2024-07-22

## 2024-07-22 PROCEDURE — 75574 CT ANGIO HRT W/3D IMAGE: CPT | Mod: 26

## 2024-08-06 NOTE — PATIENT PROFILE OB - ALCOHOL USE HISTORY SINGLE SELECT
Patient presented for elective surgery with Dr Chadwick. In preop area patient with SBPs 189 x2 despite PCP treatment. Discussion had with Dr Chadwick, decision to postpone surgery for patient’s hypertensive emergency until satisfactory BP management.
never

## 2024-10-23 ENCOUNTER — TRANSCRIPTION ENCOUNTER (OUTPATIENT)
Age: 29
End: 2024-10-23

## 2024-12-11 NOTE — OB PROVIDER TRIAGE NOTE - NSOBPROC_OBGYN_ALL_OB
Medication:   Name from pharmacy: FENOFIBRATE 160MG TABLETS          Will file in chart as: fenofibrate 160 MG tablet    The original prescription was reordered on 12/11/2024 by Tesha Michel MD. Renewing this prescription may not be appropriate.     Medication refill denied. Refills are on file at pharmacy.  
None Done

## 2025-02-04 ENCOUNTER — APPOINTMENT (OUTPATIENT)
Dept: OBGYN | Facility: CLINIC | Age: 30
End: 2025-02-04
Payer: COMMERCIAL

## 2025-02-04 ENCOUNTER — NON-APPOINTMENT (OUTPATIENT)
Age: 30
End: 2025-02-04

## 2025-02-04 ENCOUNTER — ASOB RESULT (OUTPATIENT)
Age: 30
End: 2025-02-04

## 2025-02-04 VITALS
HEART RATE: 76 BPM | HEIGHT: 62 IN | BODY MASS INDEX: 25.76 KG/M2 | DIASTOLIC BLOOD PRESSURE: 76 MMHG | SYSTOLIC BLOOD PRESSURE: 110 MMHG | WEIGHT: 140 LBS

## 2025-02-04 DIAGNOSIS — Z01.419 ENCOUNTER FOR GYNECOLOGICAL EXAMINATION (GENERAL) (ROUTINE) W/OUT ABNORMAL FINDINGS: ICD-10-CM

## 2025-02-04 DIAGNOSIS — R10.31 RIGHT LOWER QUADRANT PAIN: ICD-10-CM

## 2025-02-04 PROCEDURE — 76830 TRANSVAGINAL US NON-OB: CPT

## 2025-02-04 PROCEDURE — 90651 9VHPV VACCINE 2/3 DOSE IM: CPT

## 2025-02-04 PROCEDURE — 99395 PREV VISIT EST AGE 18-39: CPT | Mod: 25

## 2025-02-04 PROCEDURE — 99459 PELVIC EXAMINATION: CPT

## 2025-02-04 PROCEDURE — 90471 IMMUNIZATION ADMIN: CPT

## 2025-02-04 PROCEDURE — 99213 OFFICE O/P EST LOW 20 MIN: CPT | Mod: 25

## 2025-02-04 RX ORDER — DOXEPIN HYDROCHLORIDE 10 MG/1
10 CAPSULE ORAL
Refills: 0 | Status: ACTIVE | COMMUNITY

## 2025-02-04 RX ORDER — LEVOTHYROXINE SODIUM 50 UG/1
50 CAPSULE ORAL
Refills: 0 | Status: ACTIVE | COMMUNITY

## 2025-02-09 LAB
CYTOLOGY CVX/VAG DOC THIN PREP: NORMAL
HPV HIGH+LOW RISK DNA PNL CVX: NOT DETECTED

## 2025-03-28 ENCOUNTER — APPOINTMENT (OUTPATIENT)
Dept: OBGYN | Facility: CLINIC | Age: 30
End: 2025-03-28

## 2025-04-18 ENCOUNTER — APPOINTMENT (OUTPATIENT)
Dept: OBGYN | Facility: CLINIC | Age: 30
End: 2025-04-18
Payer: COMMERCIAL

## 2025-04-18 DIAGNOSIS — Z23 ENCOUNTER FOR IMMUNIZATION: ICD-10-CM

## 2025-04-18 PROCEDURE — 90651 9VHPV VACCINE 2/3 DOSE IM: CPT

## 2025-04-18 PROCEDURE — 90471 IMMUNIZATION ADMIN: CPT

## 2025-09-04 ENCOUNTER — APPOINTMENT (OUTPATIENT)
Dept: OBGYN | Facility: CLINIC | Age: 30
End: 2025-09-04
Payer: COMMERCIAL

## 2025-09-04 PROCEDURE — 90471 IMMUNIZATION ADMIN: CPT

## 2025-09-04 PROCEDURE — 90651 9VHPV VACCINE 2/3 DOSE IM: CPT
